# Patient Record
Sex: MALE | Race: BLACK OR AFRICAN AMERICAN | NOT HISPANIC OR LATINO | Employment: STUDENT | RURAL
[De-identification: names, ages, dates, MRNs, and addresses within clinical notes are randomized per-mention and may not be internally consistent; named-entity substitution may affect disease eponyms.]

---

## 2021-01-04 ENCOUNTER — HISTORICAL (OUTPATIENT)
Dept: ADMINISTRATIVE | Facility: HOSPITAL | Age: 21
End: 2021-01-04

## 2022-02-10 ENCOUNTER — OFFICE VISIT (OUTPATIENT)
Dept: FAMILY MEDICINE | Facility: CLINIC | Age: 22
End: 2022-02-10

## 2022-02-10 VITALS
TEMPERATURE: 98 F | SYSTOLIC BLOOD PRESSURE: 120 MMHG | HEART RATE: 86 BPM | RESPIRATION RATE: 18 BRPM | HEIGHT: 72 IN | OXYGEN SATURATION: 99 % | BODY MASS INDEX: 42.66 KG/M2 | WEIGHT: 315 LBS | DIASTOLIC BLOOD PRESSURE: 60 MMHG

## 2022-02-10 DIAGNOSIS — Z00.00 ROUTINE GENERAL MEDICAL EXAMINATION AT A HEALTH CARE FACILITY: Primary | ICD-10-CM

## 2022-02-10 PROCEDURE — 99499 PR PHYSICAL - SPORTS/SCHOOL: ICD-10-PCS | Mod: ,,, | Performed by: NURSE PRACTITIONER

## 2022-02-10 PROCEDURE — 99499 UNLISTED E&M SERVICE: CPT | Mod: ,,, | Performed by: NURSE PRACTITIONER

## 2022-02-10 NOTE — PROGRESS NOTES
SHAHEED Mora   [facility]  [address] [address] [address]  [phone]      PATIENT NAME: Rony Silva  : 2000  DATE: 2/10/22  MRN: [id number]      Billing Provider: SHAHEED Mora  Level of Service:   Patient PCP Information     Provider PCP Type    SHAHEED Mora General          Reason for Visit / Chief Complaint: Annual Exam (Sports Physical)       Update PCP  Update Chief Complaint         History of Present Illness / Problem Focused Workflow       Presents today for sports phyical. Denies any current problems  Reports left knee surgery 2016      Review of Systems     Review of Systems   Constitutional: Negative for chills, fatigue and fever.   HENT: Negative for congestion and sore throat.    Respiratory: Negative for cough and shortness of breath.    Cardiovascular: Negative for chest pain and palpitations.   Gastrointestinal: Negative for abdominal pain and diarrhea.   Genitourinary: Negative for dysuria.   Musculoskeletal: Negative for back pain and gait problem.        Old injury to left knee with surgical repair in 2016  Denies any problems with joint pain   Skin: Negative for rash.   Allergic/Immunologic: Negative for environmental allergies.   Neurological: Negative for dizziness, weakness and headaches.   Psychiatric/Behavioral: Negative for behavioral problems and dysphoric mood. The patient is not nervous/anxious.        Medical / Social / Family History   History reviewed. No pertinent past medical history.    Past Surgical History:   Procedure Laterality Date    Knee Scope         Social History    reports that he has never smoked. He has never used smokeless tobacco. He reports previous alcohol use. He reports previous drug use.    Family History  's family history includes Diabetes in his father; No Known Problems in his mother.    Medications and Allergies     Medications  No outpatient medications have been marked as taking  for the 2/10/22 encounter (Office Visit) with SHAHEED Mora.       Allergies  Review of patient's allergies indicates:  No Known Allergies    Physical Examination     Vitals:    02/10/22 0931   BP: 120/60   Pulse: 86   Resp: 18   Temp: 98 °F (36.7 °C)     Physical Exam  Constitutional:       General: He is not in acute distress.  HENT:      Head: Normocephalic.      Right Ear: Tympanic membrane normal.      Left Ear: Tympanic membrane normal.      Nose: Nose normal. No congestion.      Mouth/Throat:      Mouth: Mucous membranes are moist.      Pharynx: No posterior oropharyngeal erythema.   Eyes:      Extraocular Movements: Extraocular movements intact.   Cardiovascular:      Rate and Rhythm: Normal rate.      Pulses: Normal pulses.   Pulmonary:      Effort: Pulmonary effort is normal. No respiratory distress.   Abdominal:      General: Bowel sounds are normal. There is no distension.      Palpations: Abdomen is soft.      Tenderness: There is no abdominal tenderness.   Musculoskeletal:         General: No swelling or tenderness. Normal range of motion.      Cervical back: Normal range of motion.   Skin:     General: Skin is warm.   Neurological:      Mental Status: He is alert and oriented to person, place, and time.      Motor: No weakness.      Coordination: Coordination normal.      Gait: Gait normal.   Psychiatric:         Mood and Affect: Mood normal.         Behavior: Behavior normal.           Imaging / Labs     No visits with results within 1 Day(s) from this visit.   Latest known visit with results is:   No results found for any previous visit.     X-Ray Ankle Complete Left  Narrative: Left ankle, 3 views    Indications left ankle injury and pain    There is soft tissue swelling medially and laterally    No acute fracture lines are seen  Impression: Impression:    Soft tissue swelling    Place of service: [facility]    This report has been electronically signed by Fatoumata  Remy      Assessment and Plan (including Health Maintenance)      Problem List  Smart Sets  Document Outside HM   :    Health Maintenance Due   Topic Date Due    Hepatitis C Screening  Never done    COVID-19 Vaccine (1) Never done    HPV Vaccines (1 - Male 2-dose series) Never done    HIV Screening  Never done    TETANUS VACCINE  Never done    Influenza Vaccine (1) Never done       Problem List Items Addressed This Visit    None     Visit Diagnoses     Routine general medical examination at a health care facility    -  Primary        No problems noted today with physical exam . He does report injury in 2016 with left knee surgical repair; denies any pain with movement  Follow up as needed      Signature:  SHAHEED Mora  [address] [place] [address]  [phone]    Date of encounter: 2/10/22

## 2023-03-22 ENCOUNTER — HOSPITAL ENCOUNTER (EMERGENCY)
Facility: HOSPITAL | Age: 23
Discharge: HOME | End: 2023-03-22
Payer: COMMERCIAL

## 2023-03-22 VITALS
HEART RATE: 87 BPM | DIASTOLIC BLOOD PRESSURE: 73 MMHG | HEIGHT: 72 IN | TEMPERATURE: 98 F | BODY MASS INDEX: 40.23 KG/M2 | SYSTOLIC BLOOD PRESSURE: 140 MMHG | WEIGHT: 297 LBS | RESPIRATION RATE: 18 BRPM | OXYGEN SATURATION: 99 %

## 2023-03-22 DIAGNOSIS — L80 VITILIGO: Primary | ICD-10-CM

## 2023-03-22 DIAGNOSIS — L30.9 ECZEMA, UNSPECIFIED TYPE: ICD-10-CM

## 2023-03-22 DIAGNOSIS — I73.00 RAYNAUD'S DISEASE WITHOUT GANGRENE: ICD-10-CM

## 2023-03-22 LAB
ALBUMIN SERPL BCP-MCNC: 4.2 G/DL (ref 3.5–5)
ALBUMIN/GLOB SERPL: 1 {RATIO}
ALP SERPL-CCNC: 76 U/L (ref 45–115)
ALT SERPL W P-5'-P-CCNC: 38 U/L (ref 16–61)
ANION GAP SERPL CALCULATED.3IONS-SCNC: 9 MMOL/L (ref 7–16)
AST SERPL W P-5'-P-CCNC: 28 U/L (ref 15–37)
BASOPHILS # BLD AUTO: 0.09 K/UL (ref 0–0.2)
BASOPHILS NFR BLD AUTO: 0.6 % (ref 0–1)
BILIRUB SERPL-MCNC: 0.6 MG/DL (ref ?–1.2)
BUN SERPL-MCNC: 6 MG/DL (ref 7–18)
BUN/CREAT SERPL: 9 (ref 6–20)
CALCIUM SERPL-MCNC: 9.6 MG/DL (ref 8.5–10.1)
CHLORIDE SERPL-SCNC: 102 MMOL/L (ref 98–107)
CO2 SERPL-SCNC: 30 MMOL/L (ref 21–32)
CREAT SERPL-MCNC: 0.67 MG/DL (ref 0.7–1.3)
DIFFERENTIAL METHOD BLD: ABNORMAL
EGFR (NO RACE VARIABLE) (RUSH/TITUS): 136 ML/MIN/1.73M²
EOSINOPHIL # BLD AUTO: 0.14 K/UL (ref 0–0.5)
EOSINOPHIL NFR BLD AUTO: 1 % (ref 1–4)
ERYTHROCYTE [DISTWIDTH] IN BLOOD BY AUTOMATED COUNT: 13.5 % (ref 11.5–14.5)
GLOBULIN SER-MCNC: 4.3 G/DL (ref 2–4)
GLUCOSE SERPL-MCNC: 87 MG/DL (ref 74–106)
HCT VFR BLD AUTO: 45.7 % (ref 40–54)
HGB BLD-MCNC: 14.3 G/DL (ref 13.5–18)
IMM GRANULOCYTES # BLD AUTO: 0.07 K/UL (ref 0–0.04)
IMM GRANULOCYTES NFR BLD: 0.5 % (ref 0–0.4)
LYMPHOCYTES # BLD AUTO: 2.05 K/UL (ref 1–4.8)
LYMPHOCYTES NFR BLD AUTO: 14.8 % (ref 27–41)
MCH RBC QN AUTO: 26.1 PG (ref 27–31)
MCHC RBC AUTO-ENTMCNC: 31.3 G/DL (ref 32–36)
MCV RBC AUTO: 83.5 FL (ref 80–96)
MONOCYTES # BLD AUTO: 1.17 K/UL (ref 0–0.8)
MONOCYTES NFR BLD AUTO: 8.4 % (ref 2–6)
MPC BLD CALC-MCNC: 9.7 FL (ref 9.4–12.4)
NEUTROPHILS # BLD AUTO: 10.37 K/UL (ref 1.8–7.7)
NEUTROPHILS NFR BLD AUTO: 74.7 % (ref 53–65)
NRBC # BLD AUTO: 0 X10E3/UL
NRBC, AUTO (.00): 0 %
PLATELET # BLD AUTO: 440 K/UL (ref 150–400)
POTASSIUM SERPL-SCNC: 3.4 MMOL/L (ref 3.5–5.1)
PROT SERPL-MCNC: 8.5 G/DL (ref 6.4–8.2)
RBC # BLD AUTO: 5.47 M/UL (ref 4.6–6.2)
SODIUM SERPL-SCNC: 138 MMOL/L (ref 136–145)
WBC # BLD AUTO: 13.89 K/UL (ref 4.5–11)

## 2023-03-22 PROCEDURE — 99284 EMERGENCY DEPT VISIT MOD MDM: CPT | Mod: ,,, | Performed by: NURSE PRACTITIONER

## 2023-03-22 PROCEDURE — 99283 EMERGENCY DEPT VISIT LOW MDM: CPT

## 2023-03-22 PROCEDURE — 25000003 PHARM REV CODE 250: Performed by: NURSE PRACTITIONER

## 2023-03-22 PROCEDURE — 99284 PR EMERGENCY DEPT VISIT,LEVEL IV: ICD-10-PCS | Mod: ,,, | Performed by: NURSE PRACTITIONER

## 2023-03-22 PROCEDURE — 85651 RBC SED RATE NONAUTOMATED: CPT | Performed by: NURSE PRACTITIONER

## 2023-03-22 PROCEDURE — 80053 COMPREHEN METABOLIC PANEL: CPT | Performed by: NURSE PRACTITIONER

## 2023-03-22 PROCEDURE — 85025 COMPLETE CBC W/AUTO DIFF WBC: CPT | Performed by: NURSE PRACTITIONER

## 2023-03-22 RX ORDER — POTASSIUM CHLORIDE 20 MEQ/1
40 TABLET, EXTENDED RELEASE ORAL
Status: COMPLETED | OUTPATIENT
Start: 2023-03-22 | End: 2023-03-22

## 2023-03-22 RX ORDER — HYDROCORTISONE 25 MG/G
OINTMENT TOPICAL 2 TIMES DAILY
Qty: 20 G | Refills: 0 | Status: SHIPPED | OUTPATIENT
Start: 2023-03-22 | End: 2023-06-16 | Stop reason: ALTCHOICE

## 2023-03-22 RX ADMIN — POTASSIUM CHLORIDE 40 MEQ: 1500 TABLET, EXTENDED RELEASE ORAL at 11:03

## 2023-03-23 LAB — ERYTHROCYTE [SEDIMENTATION RATE] IN BLOOD BY WESTERGREN METHOD: 4 MM/HR (ref 0–15)

## 2023-03-23 NOTE — DISCHARGE INSTRUCTIONS
[facility] information: [phone]  Dr. Whitley, Shantal Hannah, SHAHEED;  SHAHEED Arias    Return to ED if any new or worsening of symptoms occur.

## 2023-03-23 NOTE — ED PROVIDER NOTES
Encounter Date: 3/22/2023       History     Chief Complaint   Patient presents with    General Illness     Pt states he has been having issues with his fingers turning cold since before [place] - pt went to [facility] in December but that he left before they saw him - pt has not followed up with PCP due to no insurance     22 year old male presents to ED with complaint of finger pain with coldness, reflux, rash, and erectile dysfunction. Patient states hands have been off and on cold with numbness/pain and discoloration for approximately 3 months. Patient states he has a sore to his finger that intermittently heals and gets worse. Denies other known trauma. He also states discoloration to skin on his fingers with skin lightening that is not his norm. Patient endorses rash that appears intermittently to inner buttocks and lower left buttock fold with increased irritation if he slides into a sitting position. Endorses previous use of sukumar and topical diaper rash creams that have been ineffective. Denies changes in products to include soaps, lotions, detergents. Reflux symptoms have also been intermittent with worsening over the last month at nighttime. Endorses relief of symptoms with laying on his left side. Erectile dysfunction also reported. Denies pertinent medical history; familial history of diabetes. Denies chest pain, SOB, fever, chills, nausea/vomiting, diarrhea, abdominal pain.     The history is provided by the patient.   Illness   The current episode started several weeks ago. The problem occurs frequently. Progression since onset: waxing/waning. Nothing relieves the symptoms. Nothing aggravates the symptoms. Associated symptoms include rash. Pertinent negatives include no photophobia, no constipation, no diarrhea, no nausea, no vomiting, no congestion, no cough and no shortness of breath. He has received no recent medical care.   Review of patient's allergies indicates:  No Known Allergies  History  reviewed. No pertinent past medical history.  Past Surgical History:   Procedure Laterality Date    Knee Scope       Family History   Problem Relation Age of Onset    No Known Problems Mother     Diabetes Father      Social History     Tobacco Use    Smoking status: Never    Smokeless tobacco: Never   Substance Use Topics    Alcohol use: Not Currently    Drug use: Not Currently     Review of Systems   Constitutional:  Negative for activity change and appetite change.   HENT:  Negative for congestion, sinus pressure and sinus pain.    Eyes:  Negative for photophobia and visual disturbance.   Respiratory:  Negative for cough and shortness of breath.    Cardiovascular:  Negative for chest pain and palpitations.   Gastrointestinal:  Negative for constipation, diarrhea, nausea and vomiting.   Endocrine: Negative for polydipsia and polyphagia.   Genitourinary:  Negative for difficulty urinating, frequency, penile pain and penile swelling.   Musculoskeletal:  Positive for arthralgias. Negative for joint swelling.   Skin:  Positive for color change and rash.   Allergic/Immunologic: Negative for environmental allergies and food allergies.   Neurological:  Positive for numbness. Negative for dizziness.   Hematological:  Negative for adenopathy. Does not bruise/bleed easily.   Psychiatric/Behavioral:  Negative for agitation and confusion.    All other systems reviewed and are negative.    Physical Exam     Initial Vitals   BP Pulse Resp Temp SpO2   03/22/23 2142 03/22/23 2142 03/22/23 2141 03/22/23 2141 03/22/23 2142   (!) 140/73 87 18 97.7 °F (36.5 °C) 99 %      MAP       --                Physical Exam    Nursing note and vitals reviewed.  Constitutional: He appears well-developed and well-nourished.   HENT:   Head: Normocephalic and atraumatic.   Eyes: EOM are normal. Pupils are equal, round, and reactive to light.   Neck: Neck supple.   Normal range of motion.  Cardiovascular:  Normal rate and regular rhythm.            Pulmonary/Chest: He has no wheezes. He has no rhonchi.   Abdominal: Abdomen is soft. He exhibits no distension. There is no abdominal tenderness.   Musculoskeletal:         General: Tenderness present. No edema.      Right hand: Decreased capillary refill.      Left hand: Decreased capillary refill.      Cervical back: Normal range of motion and neck supple.      Comments: Delayed cap refill with peripheral cyanosis to finger tips; hypopigmented spots to fingers/dorsum of hand     Skin: Skin is dry. Capillary refill takes 2 to 3 seconds. Rash noted. Rash is maculopapular.        Psychiatric: He has a normal mood and affect. Thought content normal.       Medical Screening Exam   See Full Note    ED Course   Procedures  Labs Reviewed   COMPREHENSIVE METABOLIC PANEL - Abnormal; Notable for the following components:       Result Value    Potassium 3.4 (*)     BUN 6 (*)     Creatinine 0.67 (*)     Total Protein 8.5 (*)     Globulin 4.3 (*)     All other components within normal limits   CBC WITH DIFFERENTIAL - Abnormal; Notable for the following components:    WBC 13.89 (*)     MCH 26.1 (*)     MCHC 31.3 (*)     Platelet Count 440 (*)     Neutrophils % 74.7 (*)     Lymphocytes % 14.8 (*)     Monocytes % 8.4 (*)     Immature Granulocytes % 0.5 (*)     Neutrophils, Abs 10.37 (*)     Monocytes, Absolute 1.17 (*)     Immature Granulocytes, Absolute 0.07 (*)     All other components within normal limits   SEDIMENTATION RATE, AUTOMATED - Normal   CBC W/ AUTO DIFFERENTIAL    Narrative:     The following orders were created for panel order CBC auto differential.  Procedure                               Abnormality         Status                     ---------                               -----------         ------                     CBC with Differential[[id number]]        Abnormal            Final result                 Please view results for these tests on the individual orders.          Imaging Results    None           Medications   potassium chloride SA CR tablet 40 mEq (40 mEq Oral Given 3/22/23 7869)     Medical Decision Making:   Initial Assessment:   Rash  Cold fingers  ED  Differential Diagnosis:   Eczema  Raynaud's      Clinical Tests:   Lab Tests: Ordered and Reviewed  ED Management:  MDM    Patient presents for emergent evaluation of acute general illness that poses a threat to life and/or bodily function.    In the ED patient found to have acute eczema; raynaud's syndrome, vitilago.    I ordered labs and personally reviewed them.  Labs significant for potassium 3.4; WBC 13.89; remaining labs unremarkable    Discharge MDM  Patient was managed in the ED with PO Potassium.    The response to treatment was good.    Patient was discharged in stable condition.  Detailed return precautions discussed.  Patient with referrals to Rheumatology and Dermatology; encouraged to establish care with PCP for ED symptoms; labs unremarkable for medical reasons for ED.                  Clinical Impression:   Final diagnoses:  [L80] Vitiligo (Primary)  [L30.9] Eczema, unspecified type  [I73.00] Raynaud's disease without gangrene        ED Disposition Condition    Discharge Stable          ED Prescriptions       Medication Sig Dispense Start Date End Date Auth. Provider    hydrocortisone 2.5 % ointment (Expires today) Apply topically 2 (two) times daily. for 7 days 20 g 3/22/2023 3/29/2023 SHAHEED Palmer          Follow-up Information    None          SHAHEED Palmer  03/29/23 9959

## 2023-03-31 ENCOUNTER — OFFICE VISIT (OUTPATIENT)
Dept: FAMILY MEDICINE | Facility: CLINIC | Age: 23
End: 2023-03-31
Payer: COMMERCIAL

## 2023-03-31 VITALS
DIASTOLIC BLOOD PRESSURE: 62 MMHG | OXYGEN SATURATION: 98 % | RESPIRATION RATE: 18 BRPM | HEART RATE: 82 BPM | TEMPERATURE: 98 F | SYSTOLIC BLOOD PRESSURE: 110 MMHG | HEIGHT: 72 IN | BODY MASS INDEX: 40.36 KG/M2 | WEIGHT: 298 LBS

## 2023-03-31 DIAGNOSIS — I73.00 RAYNAUD'S SYNDROME WITHOUT GANGRENE: ICD-10-CM

## 2023-03-31 DIAGNOSIS — N52.8 OTHER MALE ERECTILE DYSFUNCTION: ICD-10-CM

## 2023-03-31 DIAGNOSIS — L80 VITILIGO: ICD-10-CM

## 2023-03-31 LAB
ALBUMIN SERPL BCP-MCNC: 3.8 G/DL (ref 3.5–5)
ALBUMIN/GLOB SERPL: 0.9 {RATIO}
ALP SERPL-CCNC: 62 U/L (ref 45–115)
ALT SERPL W P-5'-P-CCNC: 36 U/L (ref 16–61)
ANION GAP SERPL CALCULATED.3IONS-SCNC: 9 MMOL/L (ref 7–16)
AST SERPL W P-5'-P-CCNC: 30 U/L (ref 15–37)
BASOPHILS # BLD AUTO: 0.07 K/UL (ref 0–0.2)
BASOPHILS NFR BLD AUTO: 0.6 % (ref 0–1)
BILIRUB SERPL-MCNC: 0.5 MG/DL (ref ?–1.2)
BUN SERPL-MCNC: 10 MG/DL (ref 7–18)
BUN/CREAT SERPL: 17 (ref 6–20)
CALCIUM SERPL-MCNC: 9.3 MG/DL (ref 8.5–10.1)
CHLORIDE SERPL-SCNC: 103 MMOL/L (ref 98–107)
CO2 SERPL-SCNC: 30 MMOL/L (ref 21–32)
CREAT SERPL-MCNC: 0.58 MG/DL (ref 0.7–1.3)
CRP SERPL-MCNC: 0.96 MG/DL (ref 0–0.8)
DIFFERENTIAL METHOD BLD: ABNORMAL
EGFR (NO RACE VARIABLE) (RUSH/TITUS): 142 ML/MIN/1.73M²
EOSINOPHIL # BLD AUTO: 0.17 K/UL (ref 0–0.5)
EOSINOPHIL NFR BLD AUTO: 1.6 % (ref 1–4)
ERYTHROCYTE [DISTWIDTH] IN BLOOD BY AUTOMATED COUNT: 13.5 % (ref 11.5–14.5)
ERYTHROCYTE [SEDIMENTATION RATE] IN BLOOD BY WESTERGREN METHOD: 2 MM/HR (ref 0–15)
FSH SERPL-ACNC: 6.7 MIU/ML (ref 1.5–12.4)
GLOBULIN SER-MCNC: 4.2 G/DL (ref 2–4)
GLUCOSE SERPL-MCNC: 74 MG/DL (ref 74–106)
HCT VFR BLD AUTO: 48.4 % (ref 40–54)
HGB BLD-MCNC: 14.7 G/DL (ref 13.5–18)
IMM GRANULOCYTES # BLD AUTO: 0.03 K/UL (ref 0–0.04)
IMM GRANULOCYTES NFR BLD: 0.3 % (ref 0–0.4)
LH SERPL-ACNC: 4.1 MIU/ML
LYMPHOCYTES # BLD AUTO: 2.08 K/UL (ref 1–4.8)
LYMPHOCYTES NFR BLD AUTO: 19.3 % (ref 27–41)
MCH RBC QN AUTO: 25.8 PG (ref 27–31)
MCHC RBC AUTO-ENTMCNC: 30.4 G/DL (ref 32–36)
MCV RBC AUTO: 84.9 FL (ref 80–96)
MONOCYTES # BLD AUTO: 1.37 K/UL (ref 0–0.8)
MONOCYTES NFR BLD AUTO: 12.7 % (ref 2–6)
MPC BLD CALC-MCNC: 10.5 FL (ref 9.4–12.4)
NEUTROPHILS # BLD AUTO: 7.08 K/UL (ref 1.8–7.7)
NEUTROPHILS NFR BLD AUTO: 65.5 % (ref 53–65)
NRBC # BLD AUTO: 0 X10E3/UL
NRBC, AUTO (.00): 0 %
PLATELET # BLD AUTO: 396 K/UL (ref 150–400)
POTASSIUM SERPL-SCNC: 4 MMOL/L (ref 3.5–5.1)
PROLACTIN SERPL-MCNC: 8.1 NG/ML
PROT SERPL-MCNC: 8 G/DL (ref 6.4–8.2)
RBC # BLD AUTO: 5.7 M/UL (ref 4.6–6.2)
RHEUMATOID FACT SER NEPH-ACNC: NEGATIVE [IU]/ML
SODIUM SERPL-SCNC: 138 MMOL/L (ref 136–145)
T4 SERPL-MCNC: 8 ΜG/DL (ref 4.5–12.1)
TSH SERPL DL<=0.005 MIU/L-ACNC: 3.76 UIU/ML (ref 0.36–3.74)
URATE SERPL-MCNC: 7.8 MG/DL (ref 3.5–7.2)
WBC # BLD AUTO: 10.8 K/UL (ref 4.5–11)

## 2023-03-31 PROCEDURE — 84436 T4: ICD-10-PCS | Mod: ,,, | Performed by: CLINICAL MEDICAL LABORATORY

## 2023-03-31 PROCEDURE — 86225 DNA ANTIBODY NATIVE: CPT | Mod: ,,, | Performed by: CLINICAL MEDICAL LABORATORY

## 2023-03-31 PROCEDURE — 83002 LUTEINIZING HORMONE: ICD-10-PCS | Mod: ,,, | Performed by: CLINICAL MEDICAL LABORATORY

## 2023-03-31 PROCEDURE — 86038 ANA EIA W/REFLEX DSDNA/ENA: ICD-10-PCS | Mod: ,,, | Performed by: CLINICAL MEDICAL LABORATORY

## 2023-03-31 PROCEDURE — 86140 C-REACTIVE PROTEIN: CPT | Mod: ,,, | Performed by: CLINICAL MEDICAL LABORATORY

## 2023-03-31 PROCEDURE — 86235 NUCLEAR ANTIGEN ANTIBODY: CPT | Mod: ,,, | Performed by: CLINICAL MEDICAL LABORATORY

## 2023-03-31 PROCEDURE — 99215 OFFICE O/P EST HI 40 MIN: CPT | Mod: ,,, | Performed by: FAMILY MEDICINE

## 2023-03-31 PROCEDURE — 1159F MED LIST DOCD IN RCRD: CPT | Mod: ,,, | Performed by: FAMILY MEDICINE

## 2023-03-31 PROCEDURE — 84550 URIC ACID: ICD-10-PCS | Mod: ,,, | Performed by: CLINICAL MEDICAL LABORATORY

## 2023-03-31 PROCEDURE — 3078F DIAST BP <80 MM HG: CPT | Mod: ,,, | Performed by: FAMILY MEDICINE

## 2023-03-31 PROCEDURE — 85651 SEDIMENTATION RATE, AUTOMATED: ICD-10-PCS | Mod: ,,, | Performed by: CLINICAL MEDICAL LABORATORY

## 2023-03-31 PROCEDURE — 86225 ANTI-DNA ANTIBODY, DOUBLE-STRANDED: ICD-10-PCS | Mod: ,,, | Performed by: CLINICAL MEDICAL LABORATORY

## 2023-03-31 PROCEDURE — 3074F PR MOST RECENT SYSTOLIC BLOOD PRESSURE < 130 MM HG: ICD-10-PCS | Mod: ,,, | Performed by: FAMILY MEDICINE

## 2023-03-31 PROCEDURE — 80050 GENERAL HEALTH PANEL: CPT | Mod: ,,, | Performed by: CLINICAL MEDICAL LABORATORY

## 2023-03-31 PROCEDURE — 99215 PR OFFICE/OUTPT VISIT, EST, LEVL V, 40-54 MIN: ICD-10-PCS | Mod: ,,, | Performed by: FAMILY MEDICINE

## 2023-03-31 PROCEDURE — 1159F PR MEDICATION LIST DOCUMENTED IN MEDICAL RECORD: ICD-10-PCS | Mod: ,,, | Performed by: FAMILY MEDICINE

## 2023-03-31 PROCEDURE — 86430 RHEUMATOID FACTOR TEST QUAL: CPT | Mod: ,,, | Performed by: CLINICAL MEDICAL LABORATORY

## 2023-03-31 PROCEDURE — 83001 FOLLICLE STIMULATING HORMONE: ICD-10-PCS | Mod: ,,, | Performed by: CLINICAL MEDICAL LABORATORY

## 2023-03-31 PROCEDURE — 85651 RBC SED RATE NONAUTOMATED: CPT | Mod: ,,, | Performed by: CLINICAL MEDICAL LABORATORY

## 2023-03-31 PROCEDURE — 83001 ASSAY OF GONADOTROPIN (FSH): CPT | Mod: ,,, | Performed by: CLINICAL MEDICAL LABORATORY

## 2023-03-31 PROCEDURE — 3008F BODY MASS INDEX DOCD: CPT | Mod: ,,, | Performed by: FAMILY MEDICINE

## 2023-03-31 PROCEDURE — 84436 ASSAY OF TOTAL THYROXINE: CPT | Mod: ,,, | Performed by: CLINICAL MEDICAL LABORATORY

## 2023-03-31 PROCEDURE — 3078F PR MOST RECENT DIASTOLIC BLOOD PRESSURE < 80 MM HG: ICD-10-PCS | Mod: ,,, | Performed by: FAMILY MEDICINE

## 2023-03-31 PROCEDURE — 3074F SYST BP LT 130 MM HG: CPT | Mod: ,,, | Performed by: FAMILY MEDICINE

## 2023-03-31 PROCEDURE — 86235 ANTIEXTRACTABLE NUCLEAR AG: ICD-10-PCS | Mod: ,,, | Performed by: CLINICAL MEDICAL LABORATORY

## 2023-03-31 PROCEDURE — 86140 C-REACTIVE PROTEIN: ICD-10-PCS | Mod: ,,, | Performed by: CLINICAL MEDICAL LABORATORY

## 2023-03-31 PROCEDURE — 84146 PROLACTIN: ICD-10-PCS | Mod: ,,, | Performed by: CLINICAL MEDICAL LABORATORY

## 2023-03-31 PROCEDURE — 86430 RHEUMATOID FACTOR SCREEN: ICD-10-PCS | Mod: ,,, | Performed by: CLINICAL MEDICAL LABORATORY

## 2023-03-31 PROCEDURE — 3008F PR BODY MASS INDEX (BMI) DOCUMENTED: ICD-10-PCS | Mod: ,,, | Performed by: FAMILY MEDICINE

## 2023-03-31 PROCEDURE — 83002 ASSAY OF GONADOTROPIN (LH): CPT | Mod: ,,, | Performed by: CLINICAL MEDICAL LABORATORY

## 2023-03-31 PROCEDURE — 84146 ASSAY OF PROLACTIN: CPT | Mod: ,,, | Performed by: CLINICAL MEDICAL LABORATORY

## 2023-03-31 PROCEDURE — 86038 ANTINUCLEAR ANTIBODIES: CPT | Mod: ,,, | Performed by: CLINICAL MEDICAL LABORATORY

## 2023-03-31 PROCEDURE — 80050 PR  GENERAL HEALTH PANEL: ICD-10-PCS | Mod: ,,, | Performed by: CLINICAL MEDICAL LABORATORY

## 2023-03-31 PROCEDURE — 84550 ASSAY OF BLOOD/URIC ACID: CPT | Mod: ,,, | Performed by: CLINICAL MEDICAL LABORATORY

## 2023-03-31 RX ORDER — SILDENAFIL 50 MG/1
50 TABLET, FILM COATED ORAL DAILY PRN
Qty: 30 TABLET | Refills: 1 | Status: SHIPPED | OUTPATIENT
Start: 2023-03-31 | End: 2023-05-26 | Stop reason: SDUPTHER

## 2023-03-31 NOTE — PROGRESS NOTES
Lester Whitley DO   [address] [place]  [address]      PATIENT NAME: Rony Silva  : 2000  DATE: 3/31/23  MRN: [id number]      Billing Provider: Lester Whitley DO  Level of Service:   Patient PCP Information       Provider PCP Type    Lester Whitley DO General            Reason for Visit / Chief Complaint: ER Follow Up (Pt was seen at [facility] on 23 and was diagnosed with Raynaud's disease. Pt c/o painful open sore to his right index finger and soreness to his right middle finger. ) and Vitiligo (Pt was also diagnosed with Vitiligo while at the ER and is scheduled with [facility] on 2023.)       Update PCP  Update Chief Complaint         History of Present Illness / Problem Focused Workflow     Rony Silva presents to the clinic with ER Follow Up (Pt was seen at [facility] on 23 and was diagnosed with Raynaud's disease. Pt c/o painful open sore to his right index finger and soreness to his right middle finger. ) and Vitiligo (Pt was also diagnosed with Vitiligo while at the ER and is scheduled with [facility] on 2023.)     Patient is noted to have Raynaud's phenomenon and was seen in the emergency room for the same.  He did have some small ulcerations on the tips of his fingers on right hand that if he will.  He states this is been going on for at least 2 months.  Is progressively getting worse.  He also noted is vitiligo which is been progressively getting worse over the last 2 years.  He also reports that recently he is had difficulty with the erections in that he is not able to either get her maintain an erection.  No history of trauma.      Review of Systems     Review of Systems   Constitutional:  Negative for activity change, appetite change, chills, fatigue and fever.   HENT:  Negative for nasal congestion, ear discharge, ear pain, mouth dryness, mouth sores, postnasal drip, sinus pressure/congestion, sore throat and  voice change.    Eyes:  Negative for pain, discharge, redness, itching and visual disturbance.   Respiratory:  Negative for apnea, cough, chest tightness, shortness of breath and wheezing.    Cardiovascular:  Negative for chest pain, palpitations and leg swelling.   Gastrointestinal:  Negative for abdominal distention, abdominal pain, anal bleeding, blood in stool, change in bowel habit, constipation, diarrhea, nausea, vomiting, reflux and change in bowel habit.   Endocrine: Negative for cold intolerance, heat intolerance, polydipsia, polyphagia and polyuria.   Genitourinary:  Positive for erectile dysfunction. Negative for difficulty urinating, enuresis, frequency, genital sores, hematuria and urgency.   Musculoskeletal:  Negative for arthralgias, back pain, gait problem, leg pain, myalgias and neck pain.   Integumentary:  Positive for color change and rash. Negative for mole/lesion, breast mass and breast discharge.   Allergic/Immunologic: Negative for environmental allergies and food allergies.   Neurological:  Negative for dizziness, vertigo, tremors, seizures, syncope, facial asymmetry, speech difficulty, weakness, light-headedness, numbness, headaches, coordination difficulties, memory loss and coordination difficulties.   Hematological:  Negative for adenopathy. Does not bruise/bleed easily.   Psychiatric/Behavioral:  Negative for agitation, behavioral problems, confusion, decreased concentration, dysphoric mood, hallucinations, self-injury, sleep disturbance and suicidal ideas. The patient is not nervous/anxious and is not hyperactive.    Breast: Negative for mass    Medical / Social / Family History     Past Medical History:   Diagnosis Date    Raynaud's syndrome without gangrene 03/22/2023    Vitiligo 03/22/2023       Past Surgical History:   Procedure Laterality Date    Knee Scope Left 2017       Social History    reports that he has never smoked. He has never been exposed to tobacco smoke. He has  never used smokeless tobacco. He reports that he does not currently use alcohol. He reports that he does not currently use drugs.    Family History  's family history includes Diabetes in his father; No Known Problems in his mother.    Medications and Allergies     Medications  No outpatient medications have been marked as taking for the 3/31/23 encounter (Office Visit) with Lester Whitley DO.       Allergies  Review of patient's allergies indicates:   Allergen Reactions    Acetomenaphthone Other (See Comments)     Elevated liver enzymes when in childhood       Physical Examination     Vitals:    03/31/23 0943   BP: 110/62   Pulse: 82   Resp: 18   Temp: 97.8 °F (36.6 °C)     Physical Exam  Constitutional:       General: He is not in acute distress.     Appearance: Normal appearance. He is obese.   HENT:      Head: Normocephalic.      Nose: Nose normal.      Mouth/Throat:      Mouth: Mucous membranes are moist.      Pharynx: Oropharynx is clear. No oropharyngeal exudate or posterior oropharyngeal erythema.   Eyes:      General: No scleral icterus.        Right eye: No discharge.         Left eye: No discharge.      Conjunctiva/sclera: Conjunctivae normal.      Pupils: Pupils are equal, round, and reactive to light.   Cardiovascular:      Rate and Rhythm: Normal rate and regular rhythm.      Pulses: Normal pulses.      Heart sounds: Normal heart sounds. No murmur heard.  Pulmonary:      Effort: Pulmonary effort is normal.      Breath sounds: Normal breath sounds. No wheezing.   Abdominal:      General: Abdomen is flat. Bowel sounds are normal.      Tenderness: There is no abdominal tenderness.   Musculoskeletal:         General: Normal range of motion.      Cervical back: Normal range of motion.      Right lower leg: No edema.      Left lower leg: No edema.   Skin:     General: Skin is warm.      Capillary Refill: Capillary refill takes less than 2 seconds.      Comments: Depigmentation of his skin noted  primarily on hands but some on his arms.   Neurological:      General: No focal deficit present.      Mental Status: He is alert and oriented to person, place, and time. Mental status is at baseline.      Cranial Nerves: No cranial nerve deficit.      Sensory: No sensory deficit.      Motor: No weakness.      Coordination: Coordination normal.      Gait: Gait normal.      Deep Tendon Reflexes: Reflexes normal.   Psychiatric:         Mood and Affect: Mood normal.         Behavior: Behavior normal.         Thought Content: Thought content normal.         Judgment: Judgment normal.             Lab Results   Component Value Date    WBC 10.80 03/31/2023    HGB 14.7 03/31/2023    HCT 48.4 03/31/2023    MCV 84.9 03/31/2023     03/31/2023          Sodium   Date Value Ref Range Status   03/31/2023 138 136 - 145 mmol/L Final     Potassium   Date Value Ref Range Status   03/31/2023 4.0 3.5 - 5.1 mmol/L Final     Chloride   Date Value Ref Range Status   03/31/2023 103 98 - 107 mmol/L Final     CO2   Date Value Ref Range Status   03/31/2023 30 21 - 32 mmol/L Final     Glucose   Date Value Ref Range Status   03/31/2023 74 74 - 106 mg/dL Final     BUN   Date Value Ref Range Status   03/31/2023 10 7 - 18 mg/dL Final     Creatinine   Date Value Ref Range Status   03/31/2023 0.58 (L) 0.70 - 1.30 mg/dL Final     Calcium   Date Value Ref Range Status   03/31/2023 9.3 8.5 - 10.1 mg/dL Final     Total Protein   Date Value Ref Range Status   03/31/2023 8.0 6.4 - 8.2 g/dL Final     Albumin   Date Value Ref Range Status   03/31/2023 3.8 3.5 - 5.0 g/dL Final     Bilirubin, Total   Date Value Ref Range Status   03/31/2023 0.5 >0.0 - 1.2 mg/dL Final     Alk Phos   Date Value Ref Range Status   03/31/2023 62 45 - 115 U/L Final     AST   Date Value Ref Range Status   03/31/2023 30 15 - 37 U/L Final     ALT   Date Value Ref Range Status   03/31/2023 36 16 - 61 U/L Final     Anion Gap   Date Value Ref Range Status   03/31/2023 9 7 - 16  mmol/L Final      X-Ray Ankle Complete Left  Narrative: Left ankle, 3 views    Indications left ankle injury and pain    There is soft tissue swelling medially and laterally    No acute fracture lines are seen  Impression: Impression:    Soft tissue swelling    Place of service: [facility]    This report has been electronically signed by Fatoumata Alberto     Procedures   No results found for: CHOL  No results found for: HDL  No results found for: LDLCALC  No results found for: TRIG  No results found for: CHOLHDL   No results found for: LABA1C, HGBA1C   Lab Results   Component Value Date    TSH 3.760 (H) 03/31/2023    [id number] 8.0 03/31/2023      Assessment and Plan (including Health Maintenance)      Problem List  Smart Sets  Document Outside HM   :    Plan:         Health Maintenance Due   Topic Date Due    Hepatitis C Screening  Never done    COVID-19 Vaccine (1) Never done    HPV Vaccines (1 - Male 2-dose series) Never done    HIV Screening  Never done    TETANUS VACCINE  Never done    Influenza Vaccine (1) Never done       Problem List Items Addressed This Visit          Derm    Vitiligo    Current Assessment & Plan     Vitiligo etiology uncertain but suspect autoimmune response.  Dermatology referral for treatment           Relevant Orders    CBC Auto Differential (Completed)    Comprehensive Metabolic Panel (Completed)    Ambulatory referral/consult to Rheumatology       Cardiac/Vascular    Raynaud's syndrome without gangrene    Current Assessment & Plan     Raynaud's phenomenon etiology uncertain.  Will check DIANE rheumatoid factor sed rate CRP CBC CMP.  Avoid extreme heat or cold.  Follow-up after the tests are complete.  Rheumatology referral.           Relevant Orders    CBC Auto Differential (Completed)    Comprehensive Metabolic Panel (Completed)    TSH (Completed)    T4 (Completed)    Testosterone, Free & Total    Luteinizing Hormone (Completed)    Follicle Stimulating Hormone (Completed)     Prolactin (Completed)    C-Reactive Protein (Completed)    Sedimentation Rate (Completed)    Uric Acid (Completed)    DIANE EIA w/ Reflex to dsDNA/PHILLIP    Rheumatoid Factor Screen (Completed)    Ambulatory referral/consult to Rheumatology       Renal/    Other male erectile dysfunction    Current Assessment & Plan     Erectile dysfunction likely vascular in etiology but because of his autoimmune response with vitiligo in his Raynaud's symptoms I suspicion this is a complex autoimmune process.  Labs today.  Refer to Dermatology and Rheumatology.  Viagra p.r.n..           Relevant Medications    sildenafiL (VIAGRA) 50 MG tablet    Other Relevant Orders    Testosterone, Free & Total    Luteinizing Hormone (Completed)    Follicle Stimulating Hormone (Completed)       The patient has no Health Maintenance topics of status Not Due    Future Appointments   Date Time Provider Department Center   4/20/2023  1:15 PM Kate Marc MD [facility]        Follow up in about 4 weeks (around 4/28/2023).     Signature:  DO Miri Keitaatur [facility]  [address] [place] [address], [place]  [address]    Date of encounter: 3/31/23

## 2023-04-02 NOTE — ASSESSMENT & PLAN NOTE
Raynaud's phenomenon etiology uncertain.  Will check DIANE rheumatoid factor sed rate CRP CBC CMP.  Avoid extreme heat or cold.  Follow-up after the tests are complete.  Rheumatology referral.

## 2023-04-02 NOTE — ASSESSMENT & PLAN NOTE
Erectile dysfunction likely vascular in etiology but because of his autoimmune response with vitiligo in his Raynaud's symptoms I suspicion this is a complex autoimmune process.  Labs today.  Refer to Dermatology and Rheumatology.  Viagra p.r.n..

## 2023-04-04 LAB
ANA SER QL: POSITIVE
DSDNA IGG SERPL IA-ACNC: 4 IU (ref 0–24)
DSDNA IGG SERPL IA-ACNC: NEGATIVE [IU]/ML
ENA AB SER QL IA: 2.1 EUS (ref 0–19.9)
ENA AB SER QL IA: NEGATIVE

## 2023-05-04 ENCOUNTER — TELEPHONE (OUTPATIENT)
Dept: NEPHROLOGY | Facility: CLINIC | Age: 23
End: 2023-05-04
Payer: COMMERCIAL

## 2023-05-04 ENCOUNTER — TELEPHONE (OUTPATIENT)
Dept: RHEUMATOLOGY | Facility: CLINIC | Age: 23
End: 2023-05-04
Payer: COMMERCIAL

## 2023-05-04 NOTE — TELEPHONE ENCOUNTER
Madison Shows notified me that she had gotten a message from a patient navigator in [place] regarding his health. Madison let me know that in the message there were comments about suicidal ideations and depression. I called the patients mother, whom is worried about her child's health, and let her know that if he is having these ideations that he needs to be watched very closely and if anything else was to come about the situation then he needs to be taken to the closest ER so that he can be put on a 24 hr watch and cared for there. The mother was very appreciative of the phone call and said that if she needed anything else that she would give us a call.

## 2023-05-05 ENCOUNTER — TELEPHONE (OUTPATIENT)
Dept: RHEUMATOLOGY | Facility: CLINIC | Age: 23
End: 2023-05-05
Payer: COMMERCIAL

## 2023-05-05 NOTE — TELEPHONE ENCOUNTER
Attempted to return patient's mother call. Called both numbers in the chart without success.  Left voicemail.     Thank you,   Crystal

## 2023-05-12 ENCOUNTER — OFFICE VISIT (OUTPATIENT)
Dept: RHEUMATOLOGY | Facility: CLINIC | Age: 23
End: 2023-05-12
Payer: COMMERCIAL

## 2023-05-12 ENCOUNTER — LAB VISIT (OUTPATIENT)
Dept: LAB | Facility: HOSPITAL | Age: 23
End: 2023-05-12
Attending: INTERNAL MEDICINE
Payer: COMMERCIAL

## 2023-05-12 VITALS
HEIGHT: 72 IN | SYSTOLIC BLOOD PRESSURE: 121 MMHG | BODY MASS INDEX: 40.52 KG/M2 | WEIGHT: 299.19 LBS | DIASTOLIC BLOOD PRESSURE: 76 MMHG | HEART RATE: 91 BPM

## 2023-05-12 DIAGNOSIS — K21.9 GASTROESOPHAGEAL REFLUX DISEASE WITHOUT ESOPHAGITIS: ICD-10-CM

## 2023-05-12 DIAGNOSIS — M34.9 DIFFUSE SYSTEMIC SCLEROSIS: ICD-10-CM

## 2023-05-12 DIAGNOSIS — I73.00 RAYNAUD'S SYNDROME WITHOUT GANGRENE: ICD-10-CM

## 2023-05-12 DIAGNOSIS — L80 VITILIGO: ICD-10-CM

## 2023-05-12 DIAGNOSIS — M34.9 DIFFUSE SYSTEMIC SCLEROSIS: Primary | ICD-10-CM

## 2023-05-12 DIAGNOSIS — E66.01 CLASS 3 SEVERE OBESITY DUE TO EXCESS CALORIES WITH SERIOUS COMORBIDITY AND BODY MASS INDEX (BMI) OF 45.0 TO 49.9 IN ADULT: ICD-10-CM

## 2023-05-12 LAB
ALBUMIN SERPL BCP-MCNC: 4.2 G/DL (ref 3.5–5.2)
ALP SERPL-CCNC: 77 U/L (ref 55–135)
ALT SERPL W/O P-5'-P-CCNC: 23 U/L (ref 10–44)
ANION GAP SERPL CALC-SCNC: 7 MMOL/L (ref 8–16)
AST SERPL-CCNC: 24 U/L (ref 10–40)
BASOPHILS # BLD AUTO: 0.1 K/UL (ref 0–0.2)
BASOPHILS NFR BLD: 0.9 % (ref 0–1.9)
BILIRUB SERPL-MCNC: 0.5 MG/DL (ref 0.1–1)
BUN SERPL-MCNC: 7 MG/DL (ref 6–20)
CALCIUM SERPL-MCNC: 9.7 MG/DL (ref 8.7–10.5)
CCP AB SER IA-ACNC: <0.5 U/ML
CHLORIDE SERPL-SCNC: 102 MMOL/L (ref 95–110)
CK SERPL-CCNC: 311 U/L (ref 20–200)
CO2 SERPL-SCNC: 28 MMOL/L (ref 23–29)
CREAT SERPL-MCNC: 0.6 MG/DL (ref 0.5–1.4)
CRP SERPL-MCNC: 6.4 MG/L (ref 0–8.2)
DIFFERENTIAL METHOD: ABNORMAL
EOSINOPHIL # BLD AUTO: 0.2 K/UL (ref 0–0.5)
EOSINOPHIL NFR BLD: 1.5 % (ref 0–8)
ERYTHROCYTE [DISTWIDTH] IN BLOOD BY AUTOMATED COUNT: 13.2 % (ref 11.5–14.5)
EST. GFR  (NO RACE VARIABLE): >60 ML/MIN/1.73 M^2
GLUCOSE SERPL-MCNC: 84 MG/DL (ref 70–110)
HBV CORE AB SERPL QL IA: NORMAL
HBV SURFACE AB SER-ACNC: 5.58 MIU/ML
HBV SURFACE AB SER-ACNC: NORMAL M[IU]/ML
HBV SURFACE AG SERPL QL IA: NORMAL
HCT VFR BLD AUTO: 48.8 % (ref 40–54)
HCV AB SERPL QL IA: NORMAL
HGB BLD-MCNC: 14.6 G/DL (ref 14–18)
HIV 1+2 AB+HIV1 P24 AG SERPL QL IA: NORMAL
IMM GRANULOCYTES # BLD AUTO: 0.03 K/UL (ref 0–0.04)
IMM GRANULOCYTES NFR BLD AUTO: 0.3 % (ref 0–0.5)
LYMPHOCYTES # BLD AUTO: 2.3 K/UL (ref 1–4.8)
LYMPHOCYTES NFR BLD: 19.4 % (ref 18–48)
MCH RBC QN AUTO: 25.9 PG (ref 27–31)
MCHC RBC AUTO-ENTMCNC: 29.9 G/DL (ref 32–36)
MCV RBC AUTO: 87 FL (ref 82–98)
MONOCYTES # BLD AUTO: 1.2 K/UL (ref 0.3–1)
MONOCYTES NFR BLD: 10 % (ref 4–15)
NEUTROPHILS # BLD AUTO: 8 K/UL (ref 1.8–7.7)
NEUTROPHILS NFR BLD: 67.9 % (ref 38–73)
NRBC BLD-RTO: 0 /100 WBC
PLATELET # BLD AUTO: 488 K/UL (ref 150–450)
PMV BLD AUTO: 10.4 FL (ref 9.2–12.9)
POTASSIUM SERPL-SCNC: 3.7 MMOL/L (ref 3.5–5.1)
PROT SERPL-MCNC: 8 G/DL (ref 6–8.4)
RBC # BLD AUTO: 5.64 M/UL (ref 4.6–6.2)
SODIUM SERPL-SCNC: 137 MMOL/L (ref 136–145)
VARICELLA INTERPRETATION: POSITIVE
VARICELLA ZOSTER IGG: 640.5 AU/ML
WBC # BLD AUTO: 11.75 K/UL (ref 3.9–12.7)

## 2023-05-12 PROCEDURE — 1159F MED LIST DOCD IN RCRD: CPT | Mod: CPTII,S$GLB,, | Performed by: INTERNAL MEDICINE

## 2023-05-12 PROCEDURE — 86803 HEPATITIS C AB TEST: CPT | Performed by: INTERNAL MEDICINE

## 2023-05-12 PROCEDURE — 84165 PROTEIN E-PHORESIS SERUM: CPT | Mod: 26,,, | Performed by: PATHOLOGY

## 2023-05-12 PROCEDURE — 3078F PR MOST RECENT DIASTOLIC BLOOD PRESSURE < 80 MM HG: ICD-10-PCS | Mod: CPTII,S$GLB,, | Performed by: INTERNAL MEDICINE

## 2023-05-12 PROCEDURE — 86038 ANTINUCLEAR ANTIBODIES: CPT | Performed by: INTERNAL MEDICINE

## 2023-05-12 PROCEDURE — 87340 HEPATITIS B SURFACE AG IA: CPT | Performed by: INTERNAL MEDICINE

## 2023-05-12 PROCEDURE — 85025 COMPLETE CBC W/AUTO DIFF WBC: CPT | Performed by: INTERNAL MEDICINE

## 2023-05-12 PROCEDURE — 3074F SYST BP LT 130 MM HG: CPT | Mod: CPTII,S$GLB,, | Performed by: INTERNAL MEDICINE

## 2023-05-12 PROCEDURE — 82085 ASSAY OF ALDOLASE: CPT | Performed by: INTERNAL MEDICINE

## 2023-05-12 PROCEDURE — 82550 ASSAY OF CK (CPK): CPT | Performed by: INTERNAL MEDICINE

## 2023-05-12 PROCEDURE — 3008F PR BODY MASS INDEX (BMI) DOCUMENTED: ICD-10-PCS | Mod: CPTII,S$GLB,, | Performed by: INTERNAL MEDICINE

## 2023-05-12 PROCEDURE — 99999 PR PBB SHADOW E&M-EST. PATIENT-LVL IV: CPT | Mod: PBBFAC,,, | Performed by: INTERNAL MEDICINE

## 2023-05-12 PROCEDURE — 83516 IMMUNOASSAY NONANTIBODY: CPT | Mod: 59 | Performed by: INTERNAL MEDICINE

## 2023-05-12 PROCEDURE — 84165 PROTEIN E-PHORESIS SERUM: CPT | Performed by: INTERNAL MEDICINE

## 2023-05-12 PROCEDURE — 80053 COMPREHEN METABOLIC PANEL: CPT | Performed by: INTERNAL MEDICINE

## 2023-05-12 PROCEDURE — 36415 COLL VENOUS BLD VENIPUNCTURE: CPT | Performed by: INTERNAL MEDICINE

## 2023-05-12 PROCEDURE — 1159F PR MEDICATION LIST DOCUMENTED IN MEDICAL RECORD: ICD-10-PCS | Mod: CPTII,S$GLB,, | Performed by: INTERNAL MEDICINE

## 2023-05-12 PROCEDURE — 86235 NUCLEAR ANTIGEN ANTIBODY: CPT | Mod: 59 | Performed by: INTERNAL MEDICINE

## 2023-05-12 PROCEDURE — 86706 HEP B SURFACE ANTIBODY: CPT | Mod: 91 | Performed by: INTERNAL MEDICINE

## 2023-05-12 PROCEDURE — 83516 IMMUNOASSAY NONANTIBODY: CPT | Performed by: INTERNAL MEDICINE

## 2023-05-12 PROCEDURE — 86039 ANTINUCLEAR ANTIBODIES (ANA): CPT | Performed by: INTERNAL MEDICINE

## 2023-05-12 PROCEDURE — 3078F DIAST BP <80 MM HG: CPT | Mod: CPTII,S$GLB,, | Performed by: INTERNAL MEDICINE

## 2023-05-12 PROCEDURE — 84165 PATHOLOGIST INTERPRETATION SPE: ICD-10-PCS | Mod: 26,,, | Performed by: PATHOLOGY

## 2023-05-12 PROCEDURE — 86682 HELMINTH ANTIBODY: CPT | Performed by: INTERNAL MEDICINE

## 2023-05-12 PROCEDURE — 86200 CCP ANTIBODY: CPT | Performed by: INTERNAL MEDICINE

## 2023-05-12 PROCEDURE — 86704 HEP B CORE ANTIBODY TOTAL: CPT | Performed by: INTERNAL MEDICINE

## 2023-05-12 PROCEDURE — 3008F BODY MASS INDEX DOCD: CPT | Mod: CPTII,S$GLB,, | Performed by: INTERNAL MEDICINE

## 2023-05-12 PROCEDURE — 86235 NUCLEAR ANTIGEN ANTIBODY: CPT | Performed by: INTERNAL MEDICINE

## 2023-05-12 PROCEDURE — 86787 VARICELLA-ZOSTER ANTIBODY: CPT | Performed by: INTERNAL MEDICINE

## 2023-05-12 PROCEDURE — 86225 DNA ANTIBODY NATIVE: CPT | Performed by: INTERNAL MEDICINE

## 2023-05-12 PROCEDURE — 3074F PR MOST RECENT SYSTOLIC BLOOD PRESSURE < 130 MM HG: ICD-10-PCS | Mod: CPTII,S$GLB,, | Performed by: INTERNAL MEDICINE

## 2023-05-12 PROCEDURE — 86592 SYPHILIS TEST NON-TREP QUAL: CPT | Performed by: INTERNAL MEDICINE

## 2023-05-12 PROCEDURE — 83520 IMMUNOASSAY QUANT NOS NONAB: CPT | Performed by: INTERNAL MEDICINE

## 2023-05-12 PROCEDURE — 99999 PR PBB SHADOW E&M-EST. PATIENT-LVL IV: ICD-10-PCS | Mod: PBBFAC,,, | Performed by: INTERNAL MEDICINE

## 2023-05-12 PROCEDURE — 86140 C-REACTIVE PROTEIN: CPT | Performed by: INTERNAL MEDICINE

## 2023-05-12 PROCEDURE — 99205 OFFICE O/P NEW HI 60 MIN: CPT | Mod: S$GLB,,, | Performed by: INTERNAL MEDICINE

## 2023-05-12 PROCEDURE — 99205 PR OFFICE/OUTPT VISIT, NEW, LEVL V, 60-74 MIN: ICD-10-PCS | Mod: S$GLB,,, | Performed by: INTERNAL MEDICINE

## 2023-05-12 PROCEDURE — 87389 HIV-1 AG W/HIV-1&-2 AB AG IA: CPT | Performed by: INTERNAL MEDICINE

## 2023-05-12 RX ORDER — PANTOPRAZOLE SODIUM 40 MG/1
40 TABLET, DELAYED RELEASE ORAL DAILY
Qty: 30 TABLET | Refills: 11 | Status: SHIPPED | OUTPATIENT
Start: 2023-05-12 | End: 2023-06-06 | Stop reason: SDUPTHER

## 2023-05-12 RX ORDER — AMLODIPINE BESYLATE 5 MG/1
5 TABLET ORAL DAILY
Qty: 30 TABLET | Refills: 11 | Status: SHIPPED | OUTPATIENT
Start: 2023-05-12 | End: 2023-07-24 | Stop reason: SDUPTHER

## 2023-05-12 ASSESSMENT — ROUTINE ASSESSMENT OF PATIENT INDEX DATA (RAPID3)
AM STIFFNESS SCORE: 0, NO
PSYCHOLOGICAL DISTRESS SCORE: 0
TOTAL RAPID3 SCORE: 1
PATIENT GLOBAL ASSESSMENT SCORE: 2
MDHAQ FUNCTION SCORE: 0.3
PAIN SCORE: 0
FATIGUE SCORE: 0

## 2023-05-12 NOTE — PROGRESS NOTES
Subjective:       Patient ID: Rony Silva is a 22 y.o. male.    Chief Complaint: Disease Management    HPI  From [place], [place]    Started with sores on fingertips last spring; fingertips turn blue; and associated vitiligo over fingers for 1 year  Went to PCP who did lab March 2023 and referred to rheum  Ankles hurt  Hard to reach down due to stiffness  Fingers stiff when cold    Some vitiligo at scalp line  Denies difficulties with car; has not stairs ; getting out of bed  (Mother notes he has c/o difficulty getting out of bed)    Childhood ; had elevated liver enzymes at 2 year , 3-4 times normal; negative tests but they resolved by June before liver biopsy ; no problem since but he avoids tylenol  L knee arthroscopy    Out of school; not working    3/31/23: DIANE pos but DNA neg and PHILLIP  (SS-A, SS-B, Sm, RNP, Scl 70 and Rosy-1) negative  ESR 2  CBC, CMP normal    Fathers side has lupus    Review of Systems   Constitutional:  Negative for fever and unexpected weight change.        Gradually losing weight since 2021 (was 398 and now 299)   HENT:  Negative for mouth sores and trouble swallowing.    Eyes:  Negative for redness.   Respiratory:  Negative for cough and shortness of breath.    Cardiovascular:  Negative for chest pain.   Gastrointestinal:  Negative for constipation and diarrhea.        Heartburn   Genitourinary:  Negative for genital sores.   Skin:  Negative for rash.   Neurological:  Negative for headaches.   Hematological:  Does not bruise/bleed easily.       Objective:   /76   Pulse 91   Ht 6' (1.829 m)   Wt 135.7 kg (299 lb 2.6 oz)   BMI 40.57 kg/m²      Physical Exam   Constitutional: normal appearance.   HENT:   Nose: Nose normal.   Mouth/Throat: No ulcers  Eyes: No scleral icterus.   Cardiovascular: Normal rate and regular rhythm.   Pulmonary/Chest: He has no wheezes. He has no rales.   Abdominal: There is no abdominal tenderness.   Musculoskeletal:      Comments: No synovitis  Positive  extensor tendon rub distal anterior lower leg above ankle   Lymphadenopathy:     He has no cervical adenopathy.   Skin: No rash noted.   Salt and pepper hypopigmentation at scalp line, beard line, and over extensor surfaces of fingers                             Assessment:       1. Diffuse systemic sclerosis    2. Vitiligo    3. Raynaud's syndrome without gangrene    4. Gastroesophageal reflux disease without esophagitis    5. Class 3 severe obesity due to excess calories with serious comorbidity and body mass index (BMI) of 45.0 to 49.9 in adult            Plan:       Problem List Items Addressed This Visit          Active Problems    Diffuse systemic sclerosis - Primary     He has scleroderma changes of forearms and hands, forehead and LE below the knee, and active tendon friction rub of ankle flexors. This indicates early, active diffuse scleroderma. Additional workup is indicated to determine antibody profile and extent of systemic process including GI, lung, and cardiac involvement.     Will get lab today and schedule additional diagnostics at [facility]    Start amlodipine for Raynaud's and PPI for NAKITA    Will schedule virtual f/u 2-3 weeks to review studies; anticipate starting MMF then           Relevant Orders    Anti-scleroderma antibody    RNA polymerase III Ab, IgG    PM-Scl Antibody by Immunodiffusion    Anti Sm/RNP Antibody    Th/To Antibody    MyoMarker Panel 3    HIV 1/2 Ag/Ab (4th Gen)    Hepatitis B surface antigen    HBcAB    Hepatitis B surface antibody    Hepatitis C antibody    Strongyloides IgG Antibodies    Quantiferon Gold TB    RPR    Varicella zoster antibody, IgG    CK    Aldolase    CBC Auto Differential (Completed)    Comprehensive Metabolic Panel    C-Reactive Protein    Cyclic Citrullinated Peptide Antibody, IgG    Protein Electrophoresis, Serum    Urinalysis    DIANE Screen w/Reflex    X-Ray Chest PA And Lateral    CT Chest Without Contrast    Echo    FL Esophagram Complete    Vitiligo      This is a complication of scleroderma           Raynaud's syndrome without gangrene     Started early 2021 in conjunction with onset of systemic sclerosis with skin involvement           Relevant Medications    amLODIPine (NORVASC) 5 MG tablet    Obesity     He has lost approximately 100 lb over last 2 years; some may be from lifestyle changes but suspect some weight loss has been due to progression of scleroderma and loss of muscle mass    Advised Mediterranean style diet    Discussed exercise             Other Visit Diagnoses       Gastroesophageal reflux disease without esophagitis        Relevant Medications    pantoprazole (PROTONIX) 40 MG tablet

## 2023-05-12 NOTE — PROGRESS NOTES
Rapid3 Question Responses and Scores 5/12/2023   MDHAQ Score 0.3   Psychologic Score 0   Pain Score 0   When you awakened in the morning OVER THE LAST WEEK, did you feel stiff? No   Fatigue Score 0   Global Health Score 2   RAPID3 Score 1      Answers submitted by the patient for this visit:  Rheumatology Questionnaire (Submitted on 5/12/2023)  fever: No  eye redness: No  mouth sores: No  headaches: No  shortness of breath: No  chest pain: No  trouble swallowing: No  diarrhea: No  constipation: No  unexpected weight change: No  genital sore: No  During the last 3 days, have you had a skin rash?: No  Bruises or bleeds easily: No  cough: No

## 2023-05-12 NOTE — ASSESSMENT & PLAN NOTE
He has lost approximately 100 lb over last 2 years; some may be from lifestyle changes but suspect some weight loss has been due to progression of scleroderma and loss of muscle mass    Advised Mediterranean style diet    Discussed exercise   
He has scleroderma changes of forearms and hands, forehead and LE below the knee, and active tendon friction rub of ankle flexors. This indicates early, active diffuse scleroderma. Additional workup is indicated to determine antibody profile and extent of systemic process including GI, lung, and cardiac involvement.     Will get lab today and schedule additional diagnostics at [facility]    Start amlodipine for Raynaud's and PPI for NAKITA    Will schedule virtual f/u 2-3 weeks to review studies; anticipate starting MMF then  
Started early 2021 in conjunction with onset of systemic sclerosis with skin involvement  
This is a complication of scleroderma  
normal...

## 2023-05-13 LAB — RPR SER QL: NORMAL

## 2023-05-15 LAB
ALBUMIN SERPL ELPH-MCNC: 4.27 G/DL (ref 3.35–5.55)
ALPHA1 GLOB SERPL ELPH-MCNC: 0.3 G/DL (ref 0.17–0.41)
ALPHA2 GLOB SERPL ELPH-MCNC: 0.88 G/DL (ref 0.43–0.99)
ANA PATTERN 1: NORMAL
ANA PATTERN 2: NORMAL
ANA SER QL IF: POSITIVE
ANA TITER 2: NORMAL
ANA TITR SER IF: NORMAL {TITER}
B-GLOBULIN SERPL ELPH-MCNC: 0.91 G/DL (ref 0.5–1.1)
ENA SCL70 IGG SER IA-ACNC: <0.2 U
GAMMA GLOB SERPL ELPH-MCNC: 1.34 G/DL (ref 0.67–1.58)
PROT SERPL-MCNC: 7.7 G/DL (ref 6–8.4)
STRONGYLOIDES ANTIBODY IGG: NEGATIVE

## 2023-05-16 LAB
ALDOLASE SERPL-CCNC: 10.3 U/L (ref 1.2–7.6)
ANTI SM ANTIBODY: 0.13 RATIO (ref 0–0.99)
ANTI SM/RNP ANTIBODY: 0.08 RATIO (ref 0–0.99)
ANTI SM/RNP ANTIBODY: 0.08 RATIO (ref 0–0.99)
ANTI-SM INTERPRETATION: NEGATIVE
ANTI-SM/RNP INTERPRETATION: NEGATIVE
ANTI-SM/RNP INTERPRETATION: NEGATIVE
ANTI-SSA ANTIBODY: 0.1 RATIO (ref 0–0.99)
ANTI-SSA INTERPRETATION: NEGATIVE
ANTI-SSB ANTIBODY: 0.06 RATIO (ref 0–0.99)
ANTI-SSB INTERPRETATION: NEGATIVE
DSDNA AB SER-ACNC: NORMAL [IU]/ML
ENA SCL70 AB SER-ACNC: <0.6 U/ML
PATHOLOGIST INTERPRETATION SPE: NORMAL

## 2023-05-18 ENCOUNTER — HOSPITAL ENCOUNTER (OUTPATIENT)
Dept: RADIOLOGY | Facility: HOSPITAL | Age: 23
Discharge: HOME | End: 2023-05-18
Attending: INTERNAL MEDICINE
Payer: COMMERCIAL

## 2023-05-18 ENCOUNTER — HOSPITAL ENCOUNTER (OUTPATIENT)
Dept: CARDIOLOGY | Facility: HOSPITAL | Age: 23
Discharge: HOME | End: 2023-05-18
Attending: INTERNAL MEDICINE
Payer: COMMERCIAL

## 2023-05-18 DIAGNOSIS — M34.9 DIFFUSE SYSTEMIC SCLEROSIS: ICD-10-CM

## 2023-05-18 PROCEDURE — C8929 TTE W OR WO FOL WCON,DOPPLER: HCPCS

## 2023-05-18 PROCEDURE — 71046 X-RAY EXAM CHEST 2 VIEWS: CPT | Mod: TC

## 2023-05-18 PROCEDURE — 93306 ECHO (CUPID ONLY): ICD-10-PCS | Mod: 26,,, | Performed by: INTERNAL MEDICINE

## 2023-05-18 PROCEDURE — 71046 X-RAY EXAM CHEST 2 VIEWS: CPT | Mod: 26,,, | Performed by: RADIOLOGY

## 2023-05-18 PROCEDURE — 25500020 PHARM REV CODE 255: Performed by: INTERNAL MEDICINE

## 2023-05-18 PROCEDURE — 93306 TTE W/DOPPLER COMPLETE: CPT | Mod: 26,,, | Performed by: INTERNAL MEDICINE

## 2023-05-18 PROCEDURE — 71046 XR CHEST PA AND LATERAL: ICD-10-PCS | Mod: 26,,, | Performed by: RADIOLOGY

## 2023-05-18 RX ADMIN — PERFLUTREN 1.5 ML: 6.52 INJECTION, SUSPENSION INTRAVENOUS at 09:05

## 2023-05-19 ENCOUNTER — HOSPITAL ENCOUNTER (OUTPATIENT)
Dept: RADIOLOGY | Facility: HOSPITAL | Age: 23
Discharge: HOME | End: 2023-05-19
Attending: INTERNAL MEDICINE
Payer: COMMERCIAL

## 2023-05-19 DIAGNOSIS — M34.9 DIFFUSE SYSTEMIC SCLEROSIS: ICD-10-CM

## 2023-05-19 PROCEDURE — 74220 X-RAY XM ESOPHAGUS 1CNTRST: CPT | Mod: 26,,, | Performed by: RADIOLOGY

## 2023-05-19 PROCEDURE — 71250 CT CHEST WITHOUT CONTRAST: ICD-10-PCS | Mod: 26,,, | Performed by: RADIOLOGY

## 2023-05-19 PROCEDURE — 71250 CT THORAX DX C-: CPT | Mod: TC

## 2023-05-19 PROCEDURE — 71250 CT THORAX DX C-: CPT | Mod: 26,,, | Performed by: RADIOLOGY

## 2023-05-19 PROCEDURE — 74220 X-RAY XM ESOPHAGUS 1CNTRST: CPT | Mod: TC

## 2023-05-19 PROCEDURE — 74220 FL ESOPHAGRAM COMPLETE: ICD-10-PCS | Mod: 26,,, | Performed by: RADIOLOGY

## 2023-05-22 LAB
AORTIC ROOT ANNULUS: 2.1 CM
AORTIC VALVE CUSP SEPERATION: 1.78 CM
AV INDEX (PROSTH): 1
AV MEAN GRADIENT: 3 MMHG
AV PEAK GRADIENT: 5 MMHG
AV VALVE AREA: 3.46 CM2
AV VELOCITY RATIO: 1
CV ECHO LV RWT: 0.61 CM
DOP CALC AO PEAK VEL: 1.1 M/S
DOP CALC AO VTI: 20 CM
DOP CALC LVOT AREA: 3.5 CM2
DOP CALC LVOT DIAMETER: 2.1 CM
DOP CALC LVOT PEAK VEL: 1.1 M/S
DOP CALC LVOT STROKE VOLUME: 69.24 CM3
DOP CALCLVOT PEAK VEL VTI: 20 CM
E WAVE DECELERATION TIME: 121 MSEC
ECHO EF ESTIMATED: 55 %
ECHO LV POSTERIOR WALL: 1.36 CM (ref 0.6–1.1)
EJECTION FRACTION: 65 %
FRACTIONAL SHORTENING: 31 % (ref 28–44)
INTERVENTRICULAR SEPTUM: 1.43 CM (ref 0.6–1.1)
IVC OSTIUM: 1.4 CM
LEFT ATRIUM SIZE: 2.9 CM
LEFT INTERNAL DIMENSION IN SYSTOLE: 3.09 CM (ref 2.1–4)
LEFT VENTRICLE DIASTOLIC VOLUME: 91 ML
LEFT VENTRICLE SYSTOLIC VOLUME: 37.6 ML
LEFT VENTRICULAR INTERNAL DIMENSION IN DIASTOLE: 4.47 CM (ref 3.5–6)
LEFT VENTRICULAR MASS: 244.66 G
LVOT MG: 3 MMHG
MV PEAK E VEL: 1.02 M/S
PISA TR MAX VEL: 2.3 M/S
RA MAJOR: 5.1 CM
RA PRESSURE: 3 MMHG
RIGHT VENTRICULAR END-DIASTOLIC DIMENSION: 4.7 CM
TR MAX PG: 21 MMHG
TRICUSPID ANNULAR PLANE SYSTOLIC EXCURSION: 2.5 CM
TV REST PULMONARY ARTERY PRESSURE: 24 MMHG

## 2023-05-25 LAB — RNAP III AB SER-ACNC: <20 UNITS

## 2023-05-26 DIAGNOSIS — N52.8 OTHER MALE ERECTILE DYSFUNCTION: ICD-10-CM

## 2023-05-26 RX ORDER — SILDENAFIL 50 MG/1
50 TABLET, FILM COATED ORAL DAILY PRN
Qty: 30 TABLET | Refills: 5 | Status: SHIPPED | OUTPATIENT
Start: 2023-05-26 | End: 2023-12-04

## 2023-05-31 ENCOUNTER — OFFICE VISIT (OUTPATIENT)
Dept: FAMILY MEDICINE | Facility: CLINIC | Age: 23
End: 2023-05-31
Payer: COMMERCIAL

## 2023-05-31 VITALS
HEIGHT: 72 IN | HEART RATE: 87 BPM | RESPIRATION RATE: 18 BRPM | OXYGEN SATURATION: 97 % | SYSTOLIC BLOOD PRESSURE: 110 MMHG | TEMPERATURE: 98 F | DIASTOLIC BLOOD PRESSURE: 70 MMHG | BODY MASS INDEX: 39.14 KG/M2 | WEIGHT: 289 LBS

## 2023-05-31 DIAGNOSIS — H66.001 NON-RECURRENT ACUTE SUPPURATIVE OTITIS MEDIA OF RIGHT EAR WITHOUT SPONTANEOUS RUPTURE OF TYMPANIC MEMBRANE: ICD-10-CM

## 2023-05-31 DIAGNOSIS — J06.9 UPPER RESPIRATORY TRACT INFECTION, UNSPECIFIED TYPE: Primary | ICD-10-CM

## 2023-05-31 LAB

## 2023-05-31 PROCEDURE — 3078F PR MOST RECENT DIASTOLIC BLOOD PRESSURE < 80 MM HG: ICD-10-PCS | Mod: ICN,,, | Performed by: NURSE PRACTITIONER

## 2023-05-31 PROCEDURE — 3078F DIAST BP <80 MM HG: CPT | Mod: ICN,,, | Performed by: NURSE PRACTITIONER

## 2023-05-31 PROCEDURE — 1159F PR MEDICATION LIST DOCUMENTED IN MEDICAL RECORD: ICD-10-PCS | Mod: ICN,,, | Performed by: NURSE PRACTITIONER

## 2023-05-31 PROCEDURE — 3008F BODY MASS INDEX DOCD: CPT | Mod: ICN,,, | Performed by: NURSE PRACTITIONER

## 2023-05-31 PROCEDURE — 3074F SYST BP LT 130 MM HG: CPT | Mod: ICN,,, | Performed by: NURSE PRACTITIONER

## 2023-05-31 PROCEDURE — 1159F MED LIST DOCD IN RCRD: CPT | Mod: ICN,,, | Performed by: NURSE PRACTITIONER

## 2023-05-31 PROCEDURE — 3008F PR BODY MASS INDEX (BMI) DOCUMENTED: ICD-10-PCS | Mod: ICN,,, | Performed by: NURSE PRACTITIONER

## 2023-05-31 PROCEDURE — 99213 OFFICE O/P EST LOW 20 MIN: CPT | Mod: 25,ICN,, | Performed by: NURSE PRACTITIONER

## 2023-05-31 PROCEDURE — 1160F PR REVIEW ALL MEDS BY PRESCRIBER/CLIN PHARMACIST DOCUMENTED: ICD-10-PCS | Mod: ICN,,, | Performed by: NURSE PRACTITIONER

## 2023-05-31 PROCEDURE — 99213 PR OFFICE/OUTPT VISIT, EST, LEVL III, 20-29 MIN: ICD-10-PCS | Mod: 25,ICN,, | Performed by: NURSE PRACTITIONER

## 2023-05-31 PROCEDURE — 1160F RVW MEDS BY RX/DR IN RCRD: CPT | Mod: ICN,,, | Performed by: NURSE PRACTITIONER

## 2023-05-31 PROCEDURE — 96372 PR INJECTION,THERAP/PROPH/DIAG2ST, IM OR SUBCUT: ICD-10-PCS | Mod: ICN,,, | Performed by: NURSE PRACTITIONER

## 2023-05-31 PROCEDURE — 3074F PR MOST RECENT SYSTOLIC BLOOD PRESSURE < 130 MM HG: ICD-10-PCS | Mod: ICN,,, | Performed by: NURSE PRACTITIONER

## 2023-05-31 PROCEDURE — 96372 THER/PROPH/DIAG INJ SC/IM: CPT | Mod: ICN,,, | Performed by: NURSE PRACTITIONER

## 2023-05-31 RX ORDER — AZITHROMYCIN 250 MG/1
TABLET, FILM COATED ORAL
Qty: 6 TABLET | Refills: 0 | Status: SHIPPED | OUTPATIENT
Start: 2023-05-31 | End: 2023-05-31 | Stop reason: ALTCHOICE

## 2023-05-31 RX ORDER — CEFTRIAXONE 1 G/1
1 INJECTION, POWDER, FOR SOLUTION INTRAMUSCULAR; INTRAVENOUS
Status: COMPLETED | OUTPATIENT
Start: 2023-05-31 | End: 2023-05-31

## 2023-05-31 RX ORDER — PROMETHAZINE HYDROCHLORIDE AND DEXTROMETHORPHAN HYDROBROMIDE 6.25; 15 MG/5ML; MG/5ML
5 SYRUP ORAL NIGHTLY PRN
Qty: 118 ML | Refills: 0 | Status: SHIPPED | OUTPATIENT
Start: 2023-05-31 | End: 2023-06-10

## 2023-05-31 RX ORDER — AMOXICILLIN AND CLAVULANATE POTASSIUM 875; 125 MG/1; MG/1
1 TABLET, FILM COATED ORAL EVERY 12 HOURS
Qty: 20 TABLET | Refills: 0 | Status: SHIPPED | OUTPATIENT
Start: 2023-05-31 | End: 2023-06-16 | Stop reason: ALTCHOICE

## 2023-05-31 RX ORDER — DEXAMETHASONE SODIUM PHOSPHATE 4 MG/ML
4 INJECTION, SOLUTION INTRA-ARTICULAR; INTRALESIONAL; INTRAMUSCULAR; INTRAVENOUS; SOFT TISSUE
Status: COMPLETED | OUTPATIENT
Start: 2023-05-31 | End: 2023-05-31

## 2023-05-31 RX ADMIN — DEXAMETHASONE SODIUM PHOSPHATE 4 MG: 4 INJECTION, SOLUTION INTRA-ARTICULAR; INTRALESIONAL; INTRAMUSCULAR; INTRAVENOUS; SOFT TISSUE at 02:05

## 2023-05-31 RX ADMIN — CEFTRIAXONE 1 G: 1 INJECTION, POWDER, FOR SOLUTION INTRAMUSCULAR; INTRAVENOUS at 02:05

## 2023-05-31 NOTE — ASSESSMENT & PLAN NOTE
Rocephin and Decadron given IM in clinic   Augmentin as ordered. Ed a Hist and Promethazine DM as needed.      Pathology of current symptoms, medication side effects/risk/benefits/directions on taking medications were reviewed. Instructed patient to take cough syrup as needed. Take antibiotic as directed, complete entire course to avoid antibiotic resistance, and take OTC probiotic with antibiotic to prevent GI upset.

## 2023-05-31 NOTE — PROGRESS NOTES
Taylor Cole NP   [facility]  [address]  [place]  [address]      PATIENT NAME: Rony Silva  : 2000  DATE: 23  MRN: [id number]      Billing Provider: Taylor Cole NP  Level of Service: DE OFFICE/OUTPT VISIT, EST, LEVL III, 20-29 MIN  Patient PCP Information       Provider PCP Type    Lester Whitley,  General            Reason for Visit / Chief Complaint: Cough (Productive cough for the last week with clear sputum that has changed in color and is now green.  Normally takes Mucinex when he gets sick but it has not helped this time.), Nasal Congestion (Nasal congestion for the past week.), and Ear Fullness (Pressure to right ear that started this morning.)         History of Present Illness / Problem Focused Workflow     Rony Silva presents to the clinic with Cough (Productive cough for the last week with clear sputum that has changed in color and is now green.  Normally takes Mucinex when he gets sick but it has not helped this time.), Nasal Congestion (Nasal congestion for the past week.), and Ear Fullness (Pressure to right ear that started this morning.)     22 year old male presents to clinic with complaints of productive cough with green sputum production  and nasal congestion x 1 week, and right ear pain that started this morning. Denies fever or known sick contacts. States he has taken Mucinex at home with no improvement.       Review of Systems     @Review of Systems   Constitutional:  Negative for activity change, appetite change, fatigue and fever.   HENT:  Positive for nasal congestion, ear pain, rhinorrhea and sinus pressure/congestion. Negative for sore throat.    Eyes:  Negative for pain, redness, visual disturbance and eye dryness.   Respiratory:  Positive for cough. Negative for shortness of breath.    Cardiovascular:  Negative for chest pain and leg swelling.   Gastrointestinal:  Negative for abdominal distention, abdominal pain, constipation  and diarrhea.   Endocrine: Negative for cold intolerance, heat intolerance and polyuria.   Genitourinary:  Negative for bladder incontinence, dysuria, frequency and urgency.   Musculoskeletal:  Negative for arthralgias, gait problem and myalgias.   Integumentary:  Negative for color change, rash and wound.   Allergic/Immunologic: Negative for environmental allergies and food allergies.   Neurological:  Negative for dizziness, weakness, light-headedness and headaches.   Psychiatric/Behavioral:  Negative for behavioral problems and sleep disturbance.      Medical / Social / Family History     Past Medical History:   Diagnosis Date    Raynaud's syndrome without gangrene 03/22/2023    Vitiligo 03/22/2023       Past Surgical History:   Procedure Laterality Date    Knee Scope Left 2017       Social History    reports that he has never smoked. He has never been exposed to tobacco smoke. He has never used smokeless tobacco. He reports that he does not currently use alcohol. He reports that he does not currently use drugs.    Family History  's family history includes Diabetes in his father; Lupus in his paternal cousin; No Known Problems in his mother.    Medications and Allergies     Medications  Outpatient Medications Marked as Taking for the 5/31/23 encounter (Office Visit) with Taylor Cole NP   Medication Sig Dispense Refill    pantoprazole (PROTONIX) 40 MG tablet Take 1 tablet (40 mg total) by mouth once daily. 30 tablet 11    sildenafiL (VIAGRA) 50 MG tablet Take 1 tablet (50 mg total) by mouth daily as needed for Erectile Dysfunction. 30 tablet 5     Current Facility-Administered Medications for the 5/31/23 encounter (Office Visit) with Taylor Cole NP   Medication Dose Route Frequency Provider Last Rate Last Admin    [COMPLETED] cefTRIAXone injection 1 g  1 g Intramuscular 1 time in Clinic/HOD Taylor Cole NP   1 g at 05/31/23 1416    [COMPLETED] dexAMETHasone injection 4 mg  4 mg Intramuscular  1 time in Clinic/HOD Taylor Cole, ALTHEA   4 mg at 05/31/23 1416       Allergies  Review of patient's allergies indicates:   Allergen Reactions    Acetomenaphthone Other (See Comments)     Elevated liver enzymes when in childhood       Physical Examination     Vitals:    05/31/23 1350   BP: 110/70   Pulse: 87   Resp: 18   Temp: 97.9 °F (36.6 °C)     Physical Exam  Vitals and nursing note reviewed.   Constitutional:       Appearance: Normal appearance.   HENT:      Head: Normocephalic.      Right Ear: Tympanic membrane is injected, erythematous and bulging.      Left Ear: Tympanic membrane normal.      Nose: Congestion and rhinorrhea present. Rhinorrhea is purulent.      Right Turbinates: Pale.      Left Turbinates: Pale.      Right Sinus: Maxillary sinus tenderness and frontal sinus tenderness present.      Left Sinus: Maxillary sinus tenderness and frontal sinus tenderness present.      Mouth/Throat:      Lips: Pink.      Mouth: Mucous membranes are moist.      Pharynx: Oropharyngeal exudate (clear post nasal drainage.) and posterior oropharyngeal erythema present.   Eyes:      Conjunctiva/sclera: Conjunctivae normal.   Cardiovascular:      Rate and Rhythm: Normal rate and regular rhythm.      Pulses: Normal pulses.      Heart sounds: Normal heart sounds.   Pulmonary:      Effort: Pulmonary effort is normal.      Breath sounds: Examination of the right-upper field reveals rhonchi. Examination of the left-upper field reveals rhonchi. Rhonchi present. No wheezing.   Abdominal:      General: Abdomen is flat. Bowel sounds are normal. There is no distension.      Palpations: Abdomen is soft.      Tenderness: There is no abdominal tenderness.   Musculoskeletal:         General: Normal range of motion.      Cervical back: Normal range of motion.   Skin:     General: Skin is warm and dry.      Capillary Refill: Capillary refill takes less than 2 seconds.   Neurological:      General: No focal deficit present.      Mental  Status: He is alert and oriented to person, place, and time. Mental status is at baseline.   Psychiatric:         Mood and Affect: Mood normal.         Behavior: Behavior normal.             Lab Results   Component Value Date    WBC 11.75 05/12/2023    HGB 14.6 05/12/2023    HCT 48.8 05/12/2023    MCV 87 05/12/2023     (H) 05/12/2023        CMP  Sodium   Date Value Ref Range Status   05/12/2023 137 136 - 145 mmol/L Final     Potassium   Date Value Ref Range Status   05/12/2023 3.7 3.5 - 5.1 mmol/L Final     Chloride   Date Value Ref Range Status   05/12/2023 102 95 - 110 mmol/L Final     CO2   Date Value Ref Range Status   05/12/2023 28 23 - 29 mmol/L Final     Glucose   Date Value Ref Range Status   05/12/2023 84 70 - 110 mg/dL Final     BUN   Date Value Ref Range Status   05/12/2023 7 6 - 20 mg/dL Final     Creatinine   Date Value Ref Range Status   05/12/2023 0.6 0.5 - 1.4 mg/dL Final     Calcium   Date Value Ref Range Status   05/12/2023 9.7 8.7 - 10.5 mg/dL Final     Total Protein   Date Value Ref Range Status   05/12/2023 8.0 6.0 - 8.4 g/dL Final     Albumin   Date Value Ref Range Status   05/12/2023 4.2 3.5 - 5.2 g/dL Final     Total Bilirubin   Date Value Ref Range Status   05/12/2023 0.5 0.1 - 1.0 mg/dL Final     Comment:     For infants and newborns, interpretation of results should be based  on gestational age, weight and in agreement with clinical  observations.    Premature Infant recommended reference ranges:  Up to 24 hours.............<8.0 mg/dL  Up to 48 hours............<12.0 mg/dL  3-5 days..................<15.0 mg/dL  6-29 days.................<15.0 mg/dL       Alkaline Phosphatase   Date Value Ref Range Status   05/12/2023 77 55 - 135 U/L Final     AST   Date Value Ref Range Status   05/12/2023 24 10 - 40 U/L Final     ALT   Date Value Ref Range Status   05/12/2023 23 10 - 44 U/L Final     Anion Gap   Date Value Ref Range Status   05/12/2023 7 (L) 8 - 16 mmol/L Final     eGFR   Date  Value Ref Range Status   05/12/2023 >60.0 >60 mL/min/1.73 m^2 Final     Procedures   Assessment and Plan (including Health Maintenance)   :    Plan:           Problem List Items Addressed This Visit          ENT    Upper respiratory tract infection - Primary    Current Assessment & Plan     Rocephin and Decadron given IM in clinic   Augmentin as ordered. Ed a Hist and Promethazine DM as needed.      Pathology of current symptoms, medication side effects/risk/benefits/directions on taking medications were reviewed. Instructed patient to take cough syrup as needed. Take antibiotic as directed, complete entire course to avoid antibiotic resistance, and take OTC probiotic with antibiotic to prevent GI upset.           Relevant Medications    cefTRIAXone injection 1 g (Completed)    dexAMETHasone injection 4 mg (Completed)    chlorpheniramine-phenylephrine 4-10 mg per tablet    promethazine-dextromethorphan (PROMETHAZINE-DM) 6.25-15 mg/5 mL Syrp    amoxicillin-clavulanate 875-125mg (AUGMENTIN) 875-125 mg per tablet    Non-recurrent acute suppurative otitis media of right ear without spontaneous rupture of tympanic membrane    Current Assessment & Plan     Rocephin and Decadron given IM in clinic.   Augmentin as ordered and Ed a Hist as needed.    Reviewed pathology of current symptoms, medication side effects/risk/benefits/directions on taking medications, and worsening or persistent symptoms that require  follow up in next 2-3 days. Steroid nasal sprays or decongestant use as needed, and Tylenol or Motrin as needed for pain and/or fever and increase fluid intake. Patient was instructed to take antibiotic as directed, complete entire course to avoid antibiotic resistance, and take OTC probiotic with antibiotic to prevent GI upset. All medication benefits and risks discussed with patient. Instructed the patient to return if symptoms persist or worsen.               Relevant Medications    cefTRIAXone injection 1 g  (Completed)    chlorpheniramine-phenylephrine 4-10 mg per tablet    amoxicillin-clavulanate 875-125mg (AUGMENTIN) 875-125 mg per tablet       Health Maintenance Topics with due status: Not Due       Topic Last Completion Date    Influenza Vaccine Not Due       Future Appointments   Date Time Provider Department Center   6/6/2023  9:30 AM [facility]        Health Maintenance Due   Topic Date Due    COVID-19 Vaccine (1) Never done    HPV Vaccines (1 - Male 2-dose series) Never done    TETANUS VACCINE  Never done        No follow-ups on file.     Signature:  Taylor Cole NP  [facility]  [address] [address]  [address]    Date of encounter: 5/31/23

## 2023-05-31 NOTE — ASSESSMENT & PLAN NOTE
Rocephin and Decadron given IM in clinic.   Augmentin as ordered and Ed a Hist as needed.    Reviewed pathology of current symptoms, medication side effects/risk/benefits/directions on taking medications, and worsening or persistent symptoms that require  follow up in next 2-3 days. Steroid nasal sprays or decongestant use as needed, and Tylenol or Motrin as needed for pain and/or fever and increase fluid intake. Patient was instructed to take antibiotic as directed, complete entire course to avoid antibiotic resistance, and take OTC probiotic with antibiotic to prevent GI upset. All medication benefits and risks discussed with patient. Instructed the patient to return if symptoms persist or worsen.

## 2023-06-06 ENCOUNTER — OFFICE VISIT (OUTPATIENT)
Dept: RHEUMATOLOGY | Facility: CLINIC | Age: 23
End: 2023-06-06
Payer: COMMERCIAL

## 2023-06-06 VITALS
DIASTOLIC BLOOD PRESSURE: 69 MMHG | BODY MASS INDEX: 39.84 KG/M2 | WEIGHT: 294.13 LBS | HEIGHT: 72 IN | HEART RATE: 81 BPM | SYSTOLIC BLOOD PRESSURE: 121 MMHG

## 2023-06-06 DIAGNOSIS — Z79.899 HIGH RISK MEDICATION USE: ICD-10-CM

## 2023-06-06 DIAGNOSIS — M34.9 DIFFUSE SYSTEMIC SCLEROSIS: Primary | ICD-10-CM

## 2023-06-06 DIAGNOSIS — K22.4 ESOPHAGEAL DYSMOTILITY DUE TO SYSTEMIC DISEASE: ICD-10-CM

## 2023-06-06 DIAGNOSIS — K21.9 GASTROESOPHAGEAL REFLUX DISEASE WITHOUT ESOPHAGITIS: ICD-10-CM

## 2023-06-06 DIAGNOSIS — I73.00 RAYNAUD'S SYNDROME WITHOUT GANGRENE: ICD-10-CM

## 2023-06-06 DIAGNOSIS — J22 LOWER RESPIRATORY INFECTION: ICD-10-CM

## 2023-06-06 DIAGNOSIS — L80 VITILIGO: ICD-10-CM

## 2023-06-06 PROCEDURE — 1159F PR MEDICATION LIST DOCUMENTED IN MEDICAL RECORD: ICD-10-PCS | Mod: CPTII,S$GLB,, | Performed by: INTERNAL MEDICINE

## 2023-06-06 PROCEDURE — 3008F BODY MASS INDEX DOCD: CPT | Mod: CPTII,S$GLB,, | Performed by: INTERNAL MEDICINE

## 2023-06-06 PROCEDURE — 3078F DIAST BP <80 MM HG: CPT | Mod: CPTII,S$GLB,, | Performed by: INTERNAL MEDICINE

## 2023-06-06 PROCEDURE — 3074F PR MOST RECENT SYSTOLIC BLOOD PRESSURE < 130 MM HG: ICD-10-PCS | Mod: CPTII,S$GLB,, | Performed by: INTERNAL MEDICINE

## 2023-06-06 PROCEDURE — 1159F MED LIST DOCD IN RCRD: CPT | Mod: CPTII,S$GLB,, | Performed by: INTERNAL MEDICINE

## 2023-06-06 PROCEDURE — 99214 OFFICE O/P EST MOD 30 MIN: CPT | Mod: S$GLB,,, | Performed by: INTERNAL MEDICINE

## 2023-06-06 PROCEDURE — 3008F PR BODY MASS INDEX (BMI) DOCUMENTED: ICD-10-PCS | Mod: CPTII,S$GLB,, | Performed by: INTERNAL MEDICINE

## 2023-06-06 PROCEDURE — 3074F SYST BP LT 130 MM HG: CPT | Mod: CPTII,S$GLB,, | Performed by: INTERNAL MEDICINE

## 2023-06-06 PROCEDURE — 99999 PR PBB SHADOW E&M-EST. PATIENT-LVL III: CPT | Mod: PBBFAC,,, | Performed by: INTERNAL MEDICINE

## 2023-06-06 PROCEDURE — 99999 PR PBB SHADOW E&M-EST. PATIENT-LVL III: ICD-10-PCS | Mod: PBBFAC,,, | Performed by: INTERNAL MEDICINE

## 2023-06-06 PROCEDURE — 3078F PR MOST RECENT DIASTOLIC BLOOD PRESSURE < 80 MM HG: ICD-10-PCS | Mod: CPTII,S$GLB,, | Performed by: INTERNAL MEDICINE

## 2023-06-06 PROCEDURE — 99214 PR OFFICE/OUTPT VISIT, EST, LEVL IV, 30-39 MIN: ICD-10-PCS | Mod: S$GLB,,, | Performed by: INTERNAL MEDICINE

## 2023-06-06 RX ORDER — MYCOPHENOLATE MOFETIL 500 MG/1
TABLET ORAL
Qty: 138 TABLET | Refills: 0 | Status: ACTIVE | OUTPATIENT
Start: 2023-06-06 | End: 2023-07-21

## 2023-06-06 RX ORDER — PANTOPRAZOLE SODIUM 40 MG/1
40 TABLET, DELAYED RELEASE ORAL DAILY
Qty: 30 TABLET | Refills: 11 | Status: SHIPPED | OUTPATIENT
Start: 2023-06-06 | End: 2024-02-04 | Stop reason: SDUPTHER

## 2023-06-06 RX ORDER — MUPIROCIN 20 MG/G
OINTMENT TOPICAL 3 TIMES DAILY
Qty: 15 G | Refills: 2 | Status: SHIPPED | OUTPATIENT
Start: 2023-06-06 | End: 2023-12-04

## 2023-06-06 RX ORDER — MYCOPHENOLATE MOFETIL 500 MG/1
1500 TABLET ORAL 2 TIMES DAILY
Qty: 180 TABLET | Refills: 1 | Status: ACTIVE | OUTPATIENT
Start: 2023-07-05 | End: 2023-11-24 | Stop reason: SDUPTHER

## 2023-06-06 ASSESSMENT — ROUTINE ASSESSMENT OF PATIENT INDEX DATA (RAPID3)
AM STIFFNESS SCORE: 0, NO
TOTAL RAPID3 SCORE: 1.44
PAIN SCORE: 0
PATIENT GLOBAL ASSESSMENT SCORE: 3
FATIGUE SCORE: 0
MDHAQ FUNCTION SCORE: 0.4
PSYCHOLOGICAL DISTRESS SCORE: 1.1

## 2023-06-06 NOTE — ASSESSMENT & PLAN NOTE
No markers for poor prognosis but rapid progression of skin over last year  eval shows esoph dysmotility that is minimally symptomatic  Will  start  MMF now  Will continue PPI and amlodipine  Plan lab in 1 and 3 mo and RTC me 3 mo

## 2023-06-06 NOTE — PROGRESS NOTES
Rapid3 Question Responses and Scores 5/31/2023   MDHAQ Score 0.4   Psychologic Score 1.1   Pain Score 0   When you awakened in the morning OVER THE LAST WEEK, did you feel stiff? No   Fatigue Score 0   Global Health Score 3   RAPID3 Score 1.44      Answers submitted by the patient for this visit:  Rheumatology Questionnaire (Submitted on 5/31/2023)  fever: No  eye redness: No  mouth sores: No  headaches: No  shortness of breath: No  chest pain: No  trouble swallowing: No  diarrhea: No  constipation: No  unexpected weight change: No  genital sore: No  During the last 3 days, have you had a skin rash?: No  Bruises or bleeds easily: No  cough: Yes

## 2023-06-06 NOTE — PROGRESS NOTES
Subjective:       Patient ID: Rony Silva is a 22 y.o. male.    Chief Complaint: Disease Management    HPI    May 19 he had scans and dveloped symptoms of LRI with cough ; PCP gave antibiotics and symptoms have cleared    Esophagram showed dysmotility lower esophagus  CT showed RLL pneumonia but no interstitial lung disease    Review of Systems   Constitutional:  Negative for fever and unexpected weight change.   HENT:  Negative for mouth sores and trouble swallowing.    Eyes:  Negative for redness.   Respiratory:  Positive for cough. Negative for shortness of breath.    Cardiovascular:  Negative for chest pain.   Gastrointestinal:  Negative for constipation and diarrhea.   Genitourinary:  Negative for genital sores.   Skin:  Negative for rash.   Neurological:  Negative for headaches.   Hematological:  Does not bruise/bleed easily.       Objective:   /69   Pulse 81   Ht 6' (1.829 m)   Wt 133.4 kg (294 lb 1.5 oz)   BMI 39.89 kg/m²      Physical Exam      Assessment:       1. Diffuse systemic sclerosis    2. Gastroesophageal reflux disease without esophagitis    3. Vitiligo    4. Lower respiratory infection    5. Raynaud's syndrome without gangrene    6. Esophageal dysmotility due to systemic disease    7. High risk medication use            Plan:       Problem List Items Addressed This Visit          Active Problems    Diffuse systemic sclerosis - Primary     No markers for poor prognosis but rapid progression of skin over last year  eval shows esoph dysmotility that is minimally symptomatic  Will  start  MMF now  Will continue PPI and amlodipine  Plan lab in 1 and 3 mo and RTC me 3 mo         Relevant Medications    mycophenolate (CELLCEPT) 500 mg Tab    mycophenolate (CELLCEPT) 500 mg Tab (Start on 7/5/2023)    Other Relevant Orders    CK    Sedimentation rate    C-Reactive Protein    Vitiligo     Due to scleroderma; will follow         Raynaud's syndrome without gangrene     Stable with minimal dry  pits; no ulcers    Continue amlodipine and use bactroban prn         Relevant Medications    mupirocin (BACTROBAN) 2 % ointment    Lower respiratory infection     Symptoms have resolved with antibiotics         Esophageal dysmotility due to systemic disease     Rare dysphagia and no heartburn but PPI advised         Relevant Medications    pantoprazole (PROTONIX) 40 MG tablet     Other Visit Diagnoses       Gastroesophageal reflux disease without esophagitis        Relevant Medications    pantoprazole (PROTONIX) 40 MG tablet    High risk medication use        Relevant Orders    Comprehensive Metabolic Panel    CBC Auto Differential        Discussed work, school , and football  He was a  and I advised against high impact training and sports.   I recommend he increase low impact exercise and look at school or non-physical job options

## 2023-06-12 ENCOUNTER — SPECIALTY PHARMACY (OUTPATIENT)
Dept: PHARMACY | Facility: CLINIC | Age: 23
End: 2023-06-12
Payer: COMMERCIAL

## 2023-06-12 NOTE — TELEPHONE ENCOUNTER
New Rx received for mycophenolate. No PA required. Test claim: $45 copay for generic. Pt has [email or web address] insurance. No FA available.

## 2023-06-12 NOTE — TELEPHONE ENCOUNTER
Rodrigo, this is Margaret Samuel, clinical pharmacist with [facility] that is part of your care team.  We have begun working on your prescription that your doctor has sent us for Mycophenolate. Our next steps include:     Working with your insurance company to obtain approval for your medication  Working with you to ensure your medication is affordable     We will be calling you along the way with updates on your medication but if you have any concerns or receive information that you would like to discuss please reach us at [phone].    Welcome call outcome: Left voicemail

## 2023-06-16 ENCOUNTER — SPECIALTY PHARMACY (OUTPATIENT)
Dept: PHARMACY | Facility: CLINIC | Age: 23
End: 2023-06-16
Payer: COMMERCIAL

## 2023-06-16 DIAGNOSIS — M34.9 DIFFUSE SYSTEMIC SCLEROSIS: Primary | ICD-10-CM

## 2023-06-16 NOTE — TELEPHONE ENCOUNTER
Specialty Pharmacy - Initial Clinical Assessment    Specialty Medication Orders Linked to Encounter      Flowsheet Row Most Recent Value   Medication #1 mycophenolate (CELLCEPT) 500 mg Tab (Order#[id number], Rx#[id number])   Medication #2 mycophenolate (CELLCEPT) 500 mg Tab (Order#[id number], Rx#[id number])          Patient Diagnosis   M34.9 - Diffuse systemic sclerosis    Subjective    Rony Silva is a 22 y.o. male, who is followed by the specialty pharmacy service for management and education.    Recent Encounters       Date Type Provider Description    06/16/2023 Specialty Pharmacy Margaret Samuel, Caterina Initial Clinical Assessment    06/12/2023 Specialty Pharmacy Margaret Samuel, PharmD Referral Authorization            Current Outpatient Medications   Medication Sig    amLODIPine (NORVASC) 5 MG tablet Take 1 tablet (5 mg total) by mouth once daily.    mupirocin (BACTROBAN) 2 % ointment Apply topically 3 (three) times daily.    mycophenolate (CELLCEPT) 500 mg Tab Take 1 tablet (500 mg total) by mouth 2 (two) times daily for 7 days, THEN 2 tablets (1,000 mg total) 2 (two) times daily for 7 days, THEN 3 tablets (1,500 mg total) 2 (two) times daily for 16 days.    [START ON 7/5/2023] mycophenolate (CELLCEPT) 500 mg Tab Take 3 tablets (1,500 mg total) by mouth 2 (two) times daily.    pantoprazole (PROTONIX) 40 MG tablet Take 1 tablet (40 mg total) by mouth once daily.    sildenafiL (VIAGRA) 50 MG tablet Take 1 tablet (50 mg total) by mouth daily as needed for Erectile Dysfunction.   Last reviewed on 6/16/2023  4:16 PM by Margaret Samuel, PharmD    Review of patient's allergies indicates:   Allergen Reactions    Acetomenaphthone Other (See Comments)     Elevated liver enzymes when in childhood   Last reviewed on  6/16/2023 4:16 PM by Margaret Samuel          Assessment Questions - Documented Responses      Flowsheet Row Most Recent Value   Assessment    Medication Reconciliation completed for patient Yes   During  the past 4 weeks, has patient missed any activities due to condition or medication? No   During the past 4 weeks, did patient have any of the following urgent care visits? None   Goals of Therapy Status Discussed (new start)   Status of the patients ability to self-administer: Is Able   All education points have been covered with patient? Yes, supplemental printed education provided   Welcome packet contents reviewed and discussed with patient? Yes   Assesment completed? Yes   Plan Therapy being initiated   Do you need to open a clinical intervention (i-vent)? No   Do you want to schedule first shipment? Yes   Medication #1 Assessment Info    Patient status New medication, New to [facility]   Is this medication appropriate for the patient? Yes   Is this medication effective? Not yet started          Refill Questions - Documented Responses      Flowsheet Row Most Recent Value   Patient Availability and HIPAA Verification    Does patient want to proceed with activity? Yes   HIPAA/medical authority confirmed? Yes   Relationship to patient of person spoken to? Self   Refill Screening Questions    When does the patient need to receive the medication? 06/20/23   Refill Delivery Questions    How will the patient receive the medication? Mail   When does the patient need to receive the medication? 06/20/23   Shipping Address Home   Address in [facility] confirmed and updated if neccessary? Yes   Expected Copay ($) 45   Is the patient able to afford the medication copay? Yes   Payment Method new CC added to file   Days supply of Refill 30   Supplies needed? No supplies needed   Refill activity completed? Yes   Refill activity plan Refill scheduled   Shipment/Pickup Date: 06/19/23            Objective    He has a past medical history of Raynaud's syndrome without gangrene (03/22/2023) and Vitiligo (03/22/2023).    Tried/failed medications: amlodipine, sildenafil, mupirocin    BP Readings from Last 4 Encounters:   06/06/23  121/69   05/31/23 110/70   05/12/23 121/76   03/31/23 110/62     Ht Readings from Last 4 Encounters:   06/06/23 6' (1.829 m)   05/31/23 6' (1.829 m)   05/12/23 6' (1.829 m)   03/31/23 6' (1.829 m)     Wt Readings from Last 4 Encounters:   06/06/23 133.4 kg (294 lb 1.5 oz)   05/31/23 131.1 kg (289 lb)   05/12/23 135.7 kg (299 lb 2.6 oz)   03/31/23 135.2 kg (298 lb)       The goals of prescribed drug therapy management include:  Supporting patient to meet the prescriber's medical treatment objectives  Improving or maintaining quality of life  Maintaining optimal therapy adherence  Minimizing and managing side effects      Goals of Therapy Status: Discussed (new start)    Assessment/Plan  Patient plans to start therapy on 06/20/23      Indication, dosage, appropriateness, effectiveness, safety and convenience of his specialty medication(s) were reviewed today.     Patient Education   Patient received education on the following:   Expectations and possible outcomes of therapy  Proper use, timely administration, and missed dose management  Duration of therapy  Side effects, including prevention, minimization, and management  Contraindications and safety precautions  New or changed medications, including prescribe and over the counter medications and supplements  Reviews recommended vaccinations, as appropriate  Storage, safe handling, and disposal      Tasks added this encounter   No tasks added.   Tasks due within next 3 months   No tasks due.     Margaret Samuel, PharmD  [facility]  [address] [place] [id number]  Phone: [phone]  Fax: [phone]

## 2023-06-20 ENCOUNTER — TELEPHONE (OUTPATIENT)
Dept: PHARMACY | Facility: CLINIC | Age: 23
End: 2023-06-20
Payer: COMMERCIAL

## 2023-06-20 NOTE — TELEPHONE ENCOUNTER
Lvm for patient regarding mycophenolate shipment.  Cc is declining, please obtain new card to avoid a further delay in shipment.

## 2023-07-14 ENCOUNTER — PATIENT MESSAGE (OUTPATIENT)
Dept: PHARMACY | Facility: CLINIC | Age: 23
End: 2023-07-14
Payer: COMMERCIAL

## 2023-07-24 DIAGNOSIS — I73.00 RAYNAUD'S SYNDROME WITHOUT GANGRENE: ICD-10-CM

## 2023-07-28 RX ORDER — AMLODIPINE BESYLATE 5 MG/1
5 TABLET ORAL DAILY
Qty: 30 TABLET | Refills: 5 | Status: SHIPPED | OUTPATIENT
Start: 2023-07-28 | End: 2023-12-01 | Stop reason: SDUPTHER

## 2023-07-31 ENCOUNTER — SPECIALTY PHARMACY (OUTPATIENT)
Dept: PHARMACY | Facility: CLINIC | Age: 23
End: 2023-07-31
Payer: COMMERCIAL

## 2023-08-11 NOTE — TELEPHONE ENCOUNTER
Specialty Pharmacy - Refill Coordination    Specialty Medication Orders Linked to Encounter      Flowsheet Row Most Recent Value   Medication #1 mycophenolate (CELLCEPT) 500 mg Tab (Order#[id number], Rx#[id number])          Refill Questions - Documented Responses      Flowsheet Row Most Recent Value   Patient Availability and HIPAA Verification    Does patient want to proceed with activity? Unable to Reach          We have had multiple attempts to the patient and have been unsuccessful to reach the patient. We will stop reaching out to the patient but in the event that the patient needs the med and contacts us, we will communicate and begin dispensing for the patient. At your next visit with the patient, please review the importance of being in contact with our specialty pharmacy as a part of our care team.      Rosemarie Mckeon, PharmD  [facility]  [address] [facility] [address]  Phone: [phone]  Fax: [phone]

## 2023-08-12 ENCOUNTER — PATIENT MESSAGE (OUTPATIENT)
Dept: RHEUMATOLOGY | Facility: CLINIC | Age: 23
End: 2023-08-12
Payer: COMMERCIAL

## 2023-09-29 ENCOUNTER — OFFICE VISIT (OUTPATIENT)
Dept: RHEUMATOLOGY | Facility: CLINIC | Age: 23
End: 2023-09-29
Payer: COMMERCIAL

## 2023-09-29 DIAGNOSIS — M34.9 DIFFUSE SYSTEMIC SCLEROSIS: Primary | ICD-10-CM

## 2023-09-29 DIAGNOSIS — I73.01 RAYNAUD'S PHENOMENON WITH GANGRENE: ICD-10-CM

## 2023-09-29 DIAGNOSIS — Z79.899 HIGH RISK MEDICATION USE: ICD-10-CM

## 2023-09-29 PROCEDURE — 99214 PR OFFICE/OUTPT VISIT, EST, LEVL IV, 30-39 MIN: ICD-10-PCS | Mod: 95,,, | Performed by: INTERNAL MEDICINE

## 2023-09-29 PROCEDURE — 1160F RVW MEDS BY RX/DR IN RCRD: CPT | Mod: CPTII,95,, | Performed by: INTERNAL MEDICINE

## 2023-09-29 PROCEDURE — 1159F MED LIST DOCD IN RCRD: CPT | Mod: CPTII,95,, | Performed by: INTERNAL MEDICINE

## 2023-09-29 PROCEDURE — 1160F PR REVIEW ALL MEDS BY PRESCRIBER/CLIN PHARMACIST DOCUMENTED: ICD-10-PCS | Mod: CPTII,95,, | Performed by: INTERNAL MEDICINE

## 2023-09-29 PROCEDURE — 1159F PR MEDICATION LIST DOCUMENTED IN MEDICAL RECORD: ICD-10-PCS | Mod: CPTII,95,, | Performed by: INTERNAL MEDICINE

## 2023-09-29 PROCEDURE — 99214 OFFICE O/P EST MOD 30 MIN: CPT | Mod: 95,,, | Performed by: INTERNAL MEDICINE

## 2023-09-29 ASSESSMENT — ROUTINE ASSESSMENT OF PATIENT INDEX DATA (RAPID3)
PATIENT GLOBAL ASSESSMENT SCORE: 2
FATIGUE SCORE: 0
PSYCHOLOGICAL DISTRESS SCORE: 1.1
MDHAQ FUNCTION SCORE: 0.6
TOTAL RAPID3 SCORE: 2.67
PAIN SCORE: 4

## 2023-09-29 NOTE — PROGRESS NOTES
9/29/2023     8:34 AM   Rapid3 Question Responses and Scores   MDHAQ Score 0.6   Psychologic Score 1.1   Pain Score 4   When you awakened in the morning OVER THE LAST WEEK, did you feel stiff? Yes   If Yes, please indicate the number of hours until you are as limber as you will be for the day 1   Fatigue Score 0   Global Health Score 2   RAPID3 Score 2.67     Answers submitted by the patient for this visit:  Rheumatology Questionnaire (Submitted on 9/29/2023)  fever: No  eye redness: No  mouth sores: No  headaches: No  shortness of breath: No  chest pain: No  trouble swallowing: Yes  diarrhea: No  constipation: No  unexpected weight change: No  genital sore: No  During the last 3 days, have you had a skin rash?: No  Bruises or bleeds easily: No  cough: No

## 2023-09-29 NOTE — ASSESSMENT & PLAN NOTE
telemed visit indicates that he is tolerating mycophenolate mofetil and is subjectively stable with some constitutional fatigue, ongoing swallowing limitation, and persistent skin change that he does not report as progressing    He will  Need to return for physical exam but in the meantime we will plan lab in [place] to monitor med toxicity and inflammatory markers    He will likely be a canditate for TULIP study of anifrolumab in systemic sclerosis when we initiate the trial later this year    For now continue current mycophenolate mofetil and amlodipine and PPI  Add ASA 81 mg/d for fingers  Get lab in [place] now  Schedule PFT in [place]  Schedule follow up lab and office visit in 3 mo; will reach out to him sooner if able to schedule screening exam for MARCIA Painter to use NSAID prn ankle arthritis

## 2023-09-29 NOTE — PROGRESS NOTES
Subjective:       Patient ID: Rony Silva is a 22 y.o. male.    Chief Complaint: Disease Management    HPI    From [place], [place]   Presented May 2023 with 1 year history Raynaud's and progressive skin changes , RSS 14  Negative Scl70, RNA abilio, centromere, RNP Ab  +esophageal dysmotility  - interstitial lung disease on CT   Start MMF June 2023  associated vitiligo over fingers for 1 year   Some vitiligo at scalp line  Initial eval  with minimal subjective weakness but Mother noted he had c/o difficulty getting out of bed     Childhood ; had elevated liver enzymes at 2 year , 3-4 times normal; negative tests but they resolved by June before liver biopsy ; no problem since but he avoids tylenol  L knee arthroscopy     3/31/23: DIANE pos but DNA neg and PHILLIP  (SS-A, SS-B, Sm, RNP, Scl 70 and Rosy-1) negative  ESR 2  CBC, CMP normal     Fathers side has lupus     LV 6/6/23  The patient location is: [place]  The chief complaint leading to consultation is: scleroderma    Visit type: audiovisual    Face to Face time with patient: 16 min  30 minutes of total time spent on the encounter, which includes face to face time and non-face to face time preparing to see the patient (eg, review of tests), Obtaining and/or reviewing separately obtained history, Documenting clinical information in the electronic or other health record, Independently interpreting results (not separately reported) and communicating results to the patient/family/caregiver, or Care coordination (not separately reported).         Each patient to whom he or she provides medical services by telemedicine is:  (1) informed of the relationship between the physician and patient and the respective role of any other health care provider with respect to management of the patient; and (2) notified that he or she may decline to receive medical services by telemedicine and may withdraw from such care at any time.    Notes:         Esophagram showed dysmotility lower  esophagus  CT showed RLL pneumonia but no interstitial lung disease    Taking mycophenolate mofetil daily; on second bottle  Taking 3 bid; 1500 bid    Amlodipine 5 mg/d    Feels about the same  Fingers not turning blue as much   No new skin lesions on fingers; existing ulcer maybe smaller  Tightness in armpits; limits shoulder elevation  Elbow contracture unchanged    Ankle pain and swelling sometimes  Some exercise  Energy sometimes low; some days   Not in school    Review of Systems   Constitutional:  Negative for fever and unexpected weight change.   HENT:  Positive for trouble swallowing. Negative for mouth sores.         Trouble swallowing with bread or meat; sometimes some heartburn with hamburger or sausage   Eyes:  Negative for redness.   Respiratory:  Negative for cough and shortness of breath.    Cardiovascular:  Negative for chest pain.   Gastrointestinal:  Negative for constipation and diarrhea.   Genitourinary:  Negative for genital sores.   Skin:  Negative for rash.        Dry skin lower abdomen and back improved with Aveeno lotion   Neurological:  Negative for headaches.   Hematological:  Does not bruise/bleed easily.           9/29/2023     8:34 AM   Rapid3 Question Responses and Scores   MDHAQ Score 0.6   Psychologic Score 1.1   Pain Score 4   When you awakened in the morning OVER THE LAST WEEK, did you feel stiff? Yes   If Yes, please indicate the number of hours until you are as limber as you will be for the day 1   Fatigue Score 0   Global Health Score 2   RAPID3 Score 2.67      Objective:   There were no vitals taken for this visit.     Physical Exam      Assessment:       1. Diffuse systemic sclerosis    2. Raynaud's phenomenon with gangrene    3. High risk medication use            Plan:       Problem List Items Addressed This Visit          Active Problems    Diffuse systemic sclerosis - Primary     telemed visit indicates that he is tolerating mycophenolate mofetil and is subjectively stable  with some constitutional fatigue, ongoing swallowing limitation, and persistent skin change that he does not report as progressing    He will  Need to return for physical exam but in the meantime we will plan lab in [place] to monitor med toxicity and inflammatory markers    He will likely be a canditate for TULIP study of anifrolumab in systemic sclerosis when we initiate the trial later this year    For now continue current mycophenolate mofetil and amlodipine and PPI  Add ASA 81 mg/d for fingers  Get lab in [place] now  Schedule PFT in [place]  Schedule follow up lab and office visit in 3 mo; will reach out to him sooner if able to schedule screening exam for MARCIA Painter to use NSAID prn ankle arthritis           Raynaud's phenomenon with gangrene     Says that one skin lesion persists without enlargement          High risk medication use     Needs cbc and CMP q3m while on mycophenolate mofetil   Will schedule in [place]

## 2023-09-29 NOTE — Clinical Note
Call him and Schedule cbc/CMP/sedimentation rate/CRP/CPK in [place] next week Also schedule pft in [place] Schedule in person follow up visit in 3 mo with repeat labs

## 2023-11-08 ENCOUNTER — HOSPITAL ENCOUNTER (EMERGENCY)
Facility: HOSPITAL | Age: 23
Discharge: HOME | End: 2023-11-08
Payer: COMMERCIAL

## 2023-11-08 VITALS
HEIGHT: 72 IN | TEMPERATURE: 98 F | RESPIRATION RATE: 20 BRPM | OXYGEN SATURATION: 97 % | DIASTOLIC BLOOD PRESSURE: 60 MMHG | HEART RATE: 90 BPM | SYSTOLIC BLOOD PRESSURE: 135 MMHG | WEIGHT: 260 LBS | BODY MASS INDEX: 35.21 KG/M2

## 2023-11-08 DIAGNOSIS — S32.10XS CLOSED FRACTURE OF SACRUM AND COCCYX, SEQUELA: ICD-10-CM

## 2023-11-08 DIAGNOSIS — S32.2XXS CLOSED FRACTURE OF SACRUM AND COCCYX, SEQUELA: ICD-10-CM

## 2023-11-08 DIAGNOSIS — M25.551 PAIN OF RIGHT HIP: Primary | ICD-10-CM

## 2023-11-08 DIAGNOSIS — L02.415 ABSCESS OF RIGHT HIP: ICD-10-CM

## 2023-11-08 LAB
ALBUMIN SERPL BCP-MCNC: 3.9 G/DL (ref 3.5–5)
ALBUMIN/GLOB SERPL: 1 {RATIO}
ALP SERPL-CCNC: 89 U/L (ref 45–115)
ALT SERPL W P-5'-P-CCNC: 28 U/L (ref 16–61)
ANION GAP SERPL CALCULATED.3IONS-SCNC: 11 MMOL/L (ref 7–16)
AST SERPL W P-5'-P-CCNC: 23 U/L (ref 15–37)
BASOPHILS # BLD AUTO: 0.05 K/UL (ref 0–0.2)
BASOPHILS NFR BLD AUTO: 0.5 % (ref 0–1)
BILIRUB SERPL-MCNC: 0.4 MG/DL (ref ?–1.2)
BUN SERPL-MCNC: 9 MG/DL (ref 7–18)
BUN/CREAT SERPL: 21 (ref 6–20)
CALCIUM SERPL-MCNC: 9.1 MG/DL (ref 8.5–10.1)
CHLORIDE SERPL-SCNC: 102 MMOL/L (ref 98–107)
CO2 SERPL-SCNC: 31 MMOL/L (ref 21–32)
CREAT SERPL-MCNC: 0.42 MG/DL (ref 0.7–1.3)
DIFFERENTIAL METHOD BLD: ABNORMAL
EGFR (NO RACE VARIABLE) (RUSH/TITUS): 157 ML/MIN/1.73M2
EOSINOPHIL # BLD AUTO: 0.28 K/UL (ref 0–0.5)
EOSINOPHIL NFR BLD AUTO: 2.7 % (ref 1–4)
ERYTHROCYTE [DISTWIDTH] IN BLOOD BY AUTOMATED COUNT: 13.5 % (ref 11.5–14.5)
GLOBULIN SER-MCNC: 4 G/DL (ref 2–4)
GLUCOSE SERPL-MCNC: 88 MG/DL (ref 74–106)
HCT VFR BLD AUTO: 47.2 % (ref 40–54)
HGB BLD-MCNC: 14.7 G/DL (ref 13.5–18)
LACTATE SERPL-SCNC: 0.5 MMOL/L (ref 0.4–2)
LYMPHOCYTES # BLD AUTO: 1.73 K/UL (ref 1–4.8)
LYMPHOCYTES NFR BLD AUTO: 16.7 % (ref 27–41)
MCH RBC QN AUTO: 26 PG (ref 27–31)
MCHC RBC AUTO-ENTMCNC: 31.1 G/DL (ref 32–36)
MCV RBC AUTO: 83.4 FL (ref 80–96)
MONOCYTES # BLD AUTO: 1.29 K/UL (ref 0–0.8)
MONOCYTES NFR BLD AUTO: 12.4 % (ref 2–6)
MPC BLD CALC-MCNC: 9.8 FL (ref 9.4–12.4)
NEUTROPHILS # BLD AUTO: 7.02 K/UL (ref 1.8–7.7)
NEUTROPHILS NFR BLD AUTO: 67.7 % (ref 53–65)
PLATELET # BLD AUTO: 469 K/UL (ref 150–400)
POTASSIUM SERPL-SCNC: 4.4 MMOL/L (ref 3.5–5.1)
PROT SERPL-MCNC: 7.9 G/DL (ref 6.4–8.2)
RBC # BLD AUTO: 5.66 M/UL (ref 4.6–6.2)
SODIUM SERPL-SCNC: 140 MMOL/L (ref 136–145)
WBC # BLD AUTO: 10.37 K/UL (ref 4.5–11)

## 2023-11-08 PROCEDURE — 87070 CULTURE OTHR SPECIMN AEROBIC: CPT

## 2023-11-08 PROCEDURE — 83605 ASSAY OF LACTIC ACID: CPT

## 2023-11-08 PROCEDURE — 25000003 PHARM REV CODE 250

## 2023-11-08 PROCEDURE — 99285 EMERGENCY DEPT VISIT HI MDM: CPT | Mod: 25

## 2023-11-08 PROCEDURE — 96375 TX/PRO/DX INJ NEW DRUG ADDON: CPT

## 2023-11-08 PROCEDURE — 96365 THER/PROPH/DIAG IV INF INIT: CPT

## 2023-11-08 PROCEDURE — 80053 COMPREHEN METABOLIC PANEL: CPT

## 2023-11-08 PROCEDURE — 99284 PR EMERGENCY DEPT VISIT,LEVEL IV: ICD-10-PCS | Mod: ,,,

## 2023-11-08 PROCEDURE — 63600175 PHARM REV CODE 636 W HCPCS

## 2023-11-08 PROCEDURE — 99284 EMERGENCY DEPT VISIT MOD MDM: CPT | Mod: ,,,

## 2023-11-08 PROCEDURE — 85025 COMPLETE CBC W/AUTO DIFF WBC: CPT

## 2023-11-08 RX ORDER — HYDROMORPHONE HYDROCHLORIDE 2 MG/1
2 TABLET ORAL EVERY 12 HOURS PRN
COMMUNITY

## 2023-11-08 RX ORDER — MORPHINE SULFATE 4 MG/ML
1 INJECTION, SOLUTION INTRAMUSCULAR; INTRAVENOUS
Status: COMPLETED | OUTPATIENT
Start: 2023-11-08 | End: 2023-11-08

## 2023-11-08 RX ORDER — MUPIROCIN 20 MG/G
OINTMENT TOPICAL 3 TIMES DAILY
Qty: 1 G | Refills: 0 | Status: SHIPPED | OUTPATIENT
Start: 2023-11-08

## 2023-11-08 RX ORDER — TRAMADOL HYDROCHLORIDE 50 MG/1
50 TABLET ORAL EVERY 6 HOURS PRN
Qty: 12 TABLET | Refills: 0 | Status: SHIPPED | OUTPATIENT
Start: 2023-11-08

## 2023-11-08 RX ORDER — CEPHALEXIN 250 MG/1
500 CAPSULE ORAL EVERY 12 HOURS
Qty: 28 CAPSULE | Refills: 0 | Status: SHIPPED | OUTPATIENT
Start: 2023-11-08 | End: 2023-11-13 | Stop reason: ALTCHOICE

## 2023-11-08 RX ORDER — ASPIRIN 81 MG/1
81 TABLET ORAL DAILY
COMMUNITY

## 2023-11-08 RX ORDER — NALOXONE HYDROCHLORIDE 4 MG/.1ML
1 SPRAY NASAL ONCE
COMMUNITY

## 2023-11-08 RX ORDER — ONDANSETRON 2 MG/ML
4 INJECTION INTRAMUSCULAR; INTRAVENOUS
Status: COMPLETED | OUTPATIENT
Start: 2023-11-08 | End: 2023-11-08

## 2023-11-08 RX ORDER — IBUPROFEN 600 MG/1
600 TABLET ORAL EVERY 6 HOURS PRN
Qty: 20 TABLET | Refills: 0 | Status: SHIPPED | OUTPATIENT
Start: 2023-11-08

## 2023-11-08 RX ORDER — GABAPENTIN 100 MG/1
100 CAPSULE ORAL 3 TIMES DAILY
COMMUNITY
End: 2023-12-04

## 2023-11-08 RX ADMIN — MORPHINE SULFATE 1 MG: 4 INJECTION, SOLUTION INTRAMUSCULAR; INTRAVENOUS at 03:11

## 2023-11-08 RX ADMIN — CEFTRIAXONE 1 G: 1 INJECTION, POWDER, FOR SOLUTION INTRAMUSCULAR; INTRAVENOUS at 04:11

## 2023-11-08 RX ADMIN — ONDANSETRON 4 MG: 2 INJECTION INTRAMUSCULAR; INTRAVENOUS at 03:11

## 2023-11-08 NOTE — ED PROVIDER NOTES
Encounter Date: 11/8/2023       History     Chief Complaint   Patient presents with    Hip Pain    HIP WOUND     Patient is a 23 y/o AAM who presents to the Emergency Department with c/o right hip pain. Patient was seen at [facility] on 1/20/2023 after a car fell on his right hip while working, patient was diagnosed with sacrum fracture and discharged home. Patient has a follow up Ortho appointment at [facility] on 11/22/2023, however mother endorsed an increase in pain last night with foul smelling odor and discharge from wound on right hip. Patient has a PMHx of Sclerodema.           Review of patient's allergies indicates:   Allergen Reactions    Acetomenaphthone Other (See Comments)     Elevated liver enzymes when in childhood     Past Medical History:   Diagnosis Date    Raynaud's syndrome without gangrene 03/22/2023    Vitiligo 03/22/2023     Past Surgical History:   Procedure Laterality Date    Knee Scope Left 2017     Family History   Problem Relation Age of Onset    No Known Problems Mother     Diabetes Father     Lupus Paternal Cousin      Social History     Tobacco Use    Smoking status: Never     Passive exposure: Never    Smokeless tobacco: Never   Substance Use Topics    Alcohol use: Not Currently    Drug use: Not Currently     Review of Systems   Constitutional: Negative.    HENT: Negative.     Eyes: Negative.    Respiratory: Negative.     Cardiovascular: Negative.    Gastrointestinal: Negative.    Endocrine: Negative.    Musculoskeletal:  Positive for gait problem.   Skin:  Positive for wound.        Ulcer noted to right hip   Allergic/Immunologic: Negative.    Hematological: Negative.    Psychiatric/Behavioral: Negative.           Physical Exam     Initial Vitals [11/08/23 1405]   BP Pulse Resp Temp SpO2   135/60 90 18 98.3 °F (36.8 °C) 97 %      MAP       --         Physical Exam    Nursing note and vitals reviewed.  Constitutional: Vital signs are normal. He appears well-developed and  well-nourished. He is not diaphoretic. He is cooperative.  Non-toxic appearance. He does not have a sickly appearance. He does not appear ill. No distress.   Cardiovascular:  Normal rate, regular rhythm, S1 normal, S2 normal, normal heart sounds, intact distal pulses and normal pulses.     Exam reveals no gallop, no S3, no S4, no distant heart sounds and no friction rub.       No murmur heard.  No systolic murmur is present.  Pulmonary/Chest: Effort normal and breath sounds normal.   Abdominal: Abdomen is soft and flat. Bowel sounds are normal. There is no abdominal tenderness. No hernia.   Musculoskeletal:      Right hip: Tenderness present. No deformity, lacerations, bony tenderness or crepitus. Decreased range of motion. Normal strength.     Lymphadenopathy:     He has no cervical adenopathy.     He has no axillary adenopathy.   Neurological: He is alert and oriented to person, place, and time. He has normal strength and normal reflexes. He displays normal reflexes. No cranial nerve deficit or sensory deficit. He displays a negative Romberg sign. GCS eye subscore is 4. GCS verbal subscore is 5. GCS motor subscore is 6.   Skin: Abscess noted.        Psychiatric: He has a normal mood and affect. His speech is normal and behavior is normal. Judgment and thought content normal. Cognition and memory are normal.         Medical Screening Exam   See Full Note    ED Course   Procedures  Labs Reviewed   CBC WITH DIFFERENTIAL - Abnormal; Notable for the following components:       Result Value    MCH 26.0 (*)     MCHC 31.1 (*)     Platelet Count 469 (*)     Neutrophils % 67.7 (*)     Lymphocytes % 16.7 (*)     Monocytes % 12.4 (*)     Monocytes, Absolute 1.29 (*)     All other components within normal limits   COMPREHENSIVE METABOLIC PANEL - Abnormal; Notable for the following components:    Creatinine 0.42 (*)     BUN/Creatinine Ratio 21 (*)     All other components within normal limits   LACTIC ACID, PLASMA - Normal    CULTURE, WOUND   CBC W/ AUTO DIFFERENTIAL    Narrative:     The following orders were created for panel order CBC auto differential.  Procedure                               Abnormality         Status                     ---------                               -----------         ------                     CBC with Differential[[id number]]       Abnormal            Final result                 Please view results for these tests on the individual orders.          Imaging Results              CT Hip Without Contrast Right (Final result)  Result time 11/08/23 15:10:51      Final result by Fady Fuentes DO (11/08/23 15:10:51)                   Impression:      Multiple enlarged lymph nodes located along the right common iliac and right-sided external iliac artery, the largest lymph node located along the right external iliac artery measuring up to 3.1 x 2.1 cm. Multiple enlarged right inguinal lymph nodes with the largest measuring up to 1.5 cm.  Ultrasound-guided biopsy recommended.    No acute fracture or dislocation.  No degenerative change of the hips.      Electronically signed by: Fady Fuentes  Date:    11/08/2023  Time:    15:10               Narrative:    EXAMINATION:  CT HIP WITHOUT CONTRAST RIGHT    CLINICAL HISTORY:  Hip pain, chronic, osteoarthritis suspected;    TECHNIQUE:  CT HIP WITHOUT CONTRAST RIGHT, multiple planes.    COMPARISON:  None    FINDINGS:  No acute fracture or dislocation.    No degenerative change of the hips.    Multiple enlarged lymph nodes located along the right common iliac and right-sided external iliac artery, the largest lymph node located along the right external iliac artery measuring up to 3.1 x 2.1 cm.  Multiple enlarged right inguinal lymph nodes with the largest measuring up to 1.5 cm.    No hematoma.                                       Medications   cefTRIAXone (ROCEPHIN) 1 g in dextrose 5 % in water (D5W) 100 mL IVPB (MB+) (has no administration in time  range)   morphine injection 1 mg (1 mg Intravenous Given 11/8/23 1534)   ondansetron injection 4 mg (4 mg Intravenous Given 11/8/23 1535)     Medical Decision Making  Patient is a 21 y/o AAM who presents to the Emergency Department with c/o right hip pain. Patient was seen at [facility] on 1/20/2023 after a car fell on his right hip while working, patient was diagnosed with sacrum fracture and discharged home. Patient has a follow up Ortho appointment at [facility] on 11/22/2023, however mother endorsed an increase in pain last night with foul smelling odor and discharge from wound on right hip.     Amount and/or Complexity of Data Reviewed  Labs: ordered.  Radiology: ordered.    Risk  Prescription drug management.               ED Course as of 11/08/23 1545   Wed Nov 08, 2023   1520 Lab and CT  results reviewed by me and discussed with patient has well as mother.Discharge instructions given along with strict return precautions, patient verbalizes understanding.   [AC]      ED Course User Index  [AC] Lopez Coleman FNP                    Clinical Impression:   Final diagnoses:  [M25.551] Pain of right hip (Primary)  [S32.10XS, S32.2XXS] Closed fracture of sacrum and coccyx, sequela  [L02.415] Abscess of right hip        ED Disposition Condition    Discharge Stable          ED Prescriptions       Medication Sig Dispense Start Date End Date Auth. Provider    cephALEXin (KEFLEX) 250 MG capsule Take 2 capsules (500 mg total) by mouth every 12 (twelve) hours. for 10 days 28 capsule 11/8/2023 11/18/2023 Lopez Coleman FNP    mupirocin (BACTROBAN) 2 % ointment Apply topically 3 (three) times daily. 1 g 11/8/2023 -- Lopez Coleman FNP    ibuprofen (ADVIL,MOTRIN) 600 MG tablet Take 1 tablet (600 mg total) by mouth every 6 (six) hours as needed for Pain. 20 tablet 11/8/2023 -- Lopez Coleman FNP    traMADoL (ULTRAM) 50 mg tablet Take 1 tablet (50 mg total) by mouth every 6 (six) hours as needed for Pain. 12  tablet 11/8/2023 -- Lopez Coleman FNP          Follow-up Information       Follow up With Specialties Details Why Contact Info    Lester Whitley, DO Family Medicine  As needed, If symptoms worsen [phone]  [facility] [place] [address]  [phone]               Lopez Coleman FNP  11/08/23 1541       Lopez Coleman FNP  11/08/23 1545

## 2023-11-08 NOTE — ED TRIAGE NOTES
PT ARRIVED TO ER WITH C/O (R) HIP PAIN. PT WITH KNOWN FRACTURE TO SACRUM AREA, BUT PT SAYS PAIN IS GETTING WORSE AND THAT HE HAS A WOUND OVER (R) HIP AREA THAT WILL NOT HEAL. HAS APPT WITH ORTHO ON NOV 22ND.

## 2023-11-08 NOTE — DISCHARGE INSTRUCTIONS
Take and apply medication as directed by the label on the bottle, follow up with [facility] if symptom are not improving by Friday for ultrasound of right hip and groin area.     The examination and treatment you have received in the Emergency Department today have been rendered on an emergency basis only and are not intended to be a substitute for an effort to provide complete medical care. You should contact your follow-up physician as it is important that you let him or her check you and report any new or remaining problems since it is impossible to recognize and treat all elements of an injury or illness in a single emergency care center visit.

## 2023-11-10 LAB — MICROORGANISM SPEC CULT: ABNORMAL

## 2023-11-12 ENCOUNTER — TELEPHONE (OUTPATIENT)
Dept: EMERGENCY MEDICINE | Facility: HOSPITAL | Age: 23
End: 2023-11-12
Payer: COMMERCIAL

## 2023-11-12 NOTE — TELEPHONE ENCOUNTER
spoke with mother. medications changed to cipro. Medications called to [facility] per mother request.

## 2023-11-13 ENCOUNTER — HOSPITAL ENCOUNTER (EMERGENCY)
Facility: HOSPITAL | Age: 23
Discharge: HOME | End: 2023-11-13
Payer: COMMERCIAL

## 2023-11-13 ENCOUNTER — TELEPHONE (OUTPATIENT)
Dept: EMERGENCY MEDICINE | Facility: HOSPITAL | Age: 23
End: 2023-11-13
Payer: COMMERCIAL

## 2023-11-13 VITALS
DIASTOLIC BLOOD PRESSURE: 70 MMHG | HEART RATE: 103 BPM | HEIGHT: 72 IN | OXYGEN SATURATION: 100 % | RESPIRATION RATE: 18 BRPM | BODY MASS INDEX: 35.23 KG/M2 | SYSTOLIC BLOOD PRESSURE: 116 MMHG | TEMPERATURE: 99 F | WEIGHT: 260.13 LBS

## 2023-11-13 DIAGNOSIS — T14.8XXA WOUND INFECTION: Primary | ICD-10-CM

## 2023-11-13 DIAGNOSIS — L08.9 WOUND INFECTION: Primary | ICD-10-CM

## 2023-11-13 LAB
ALBUMIN SERPL BCP-MCNC: 4.1 G/DL (ref 3.5–5)
ALBUMIN/GLOB SERPL: 0.9 {RATIO}
ALP SERPL-CCNC: 102 U/L (ref 45–115)
ALT SERPL W P-5'-P-CCNC: 28 U/L (ref 16–61)
ANION GAP SERPL CALCULATED.3IONS-SCNC: 12 MMOL/L (ref 7–16)
AST SERPL W P-5'-P-CCNC: 19 U/L (ref 15–37)
BASOPHILS # BLD AUTO: 0.03 K/UL (ref 0–0.2)
BASOPHILS NFR BLD AUTO: 0.3 % (ref 0–1)
BILIRUB SERPL-MCNC: 0.6 MG/DL (ref ?–1.2)
BUN SERPL-MCNC: 7 MG/DL (ref 7–18)
BUN/CREAT SERPL: 12 (ref 6–20)
CALCIUM SERPL-MCNC: 8.9 MG/DL (ref 8.5–10.1)
CHLORIDE SERPL-SCNC: 100 MMOL/L (ref 98–107)
CO2 SERPL-SCNC: 31 MMOL/L (ref 21–32)
CREAT SERPL-MCNC: 0.58 MG/DL (ref 0.7–1.3)
DIFFERENTIAL METHOD BLD: ABNORMAL
EGFR (NO RACE VARIABLE) (RUSH/TITUS): 142 ML/MIN/1.73M2
EOSINOPHIL # BLD AUTO: 0.35 K/UL (ref 0–0.5)
EOSINOPHIL NFR BLD AUTO: 2.9 % (ref 1–4)
ERYTHROCYTE [DISTWIDTH] IN BLOOD BY AUTOMATED COUNT: 13.7 % (ref 11.5–14.5)
GLOBULIN SER-MCNC: 4.7 G/DL (ref 2–4)
GLUCOSE SERPL-MCNC: 87 MG/DL (ref 74–106)
HCT VFR BLD AUTO: 49.3 % (ref 40–54)
HGB BLD-MCNC: 15.3 G/DL (ref 13.5–18)
LYMPHOCYTES # BLD AUTO: 1.43 K/UL (ref 1–4.8)
LYMPHOCYTES NFR BLD AUTO: 11.9 % (ref 27–41)
MCH RBC QN AUTO: 25.9 PG (ref 27–31)
MCHC RBC AUTO-ENTMCNC: 31 G/DL (ref 32–36)
MCV RBC AUTO: 83.4 FL (ref 80–96)
MONOCYTES # BLD AUTO: 1.16 K/UL (ref 0–0.8)
MONOCYTES NFR BLD AUTO: 9.7 % (ref 2–6)
MPC BLD CALC-MCNC: 9.9 FL (ref 9.4–12.4)
NEUTROPHILS # BLD AUTO: 9 K/UL (ref 1.8–7.7)
NEUTROPHILS NFR BLD AUTO: 75.2 % (ref 53–65)
PLATELET # BLD AUTO: 445 K/UL (ref 150–400)
POTASSIUM SERPL-SCNC: 3.7 MMOL/L (ref 3.5–5.1)
PROT SERPL-MCNC: 8.8 G/DL (ref 6.4–8.2)
RBC # BLD AUTO: 5.91 M/UL (ref 4.6–6.2)
SODIUM SERPL-SCNC: 139 MMOL/L (ref 136–145)
WBC # BLD AUTO: 11.97 K/UL (ref 4.5–11)

## 2023-11-13 PROCEDURE — 85025 COMPLETE CBC W/AUTO DIFF WBC: CPT | Performed by: NURSE PRACTITIONER

## 2023-11-13 PROCEDURE — 99284 PR EMERGENCY DEPT VISIT,LEVEL IV: ICD-10-PCS | Mod: ,,, | Performed by: NURSE PRACTITIONER

## 2023-11-13 PROCEDURE — 99284 EMERGENCY DEPT VISIT MOD MDM: CPT

## 2023-11-13 PROCEDURE — 99284 EMERGENCY DEPT VISIT MOD MDM: CPT | Mod: ,,, | Performed by: NURSE PRACTITIONER

## 2023-11-13 PROCEDURE — 80053 COMPREHEN METABOLIC PANEL: CPT | Performed by: NURSE PRACTITIONER

## 2023-11-13 RX ORDER — CIPROFLOXACIN 500 MG/1
500 TABLET ORAL 2 TIMES DAILY
COMMUNITY
Start: 2023-11-11 | End: 2023-12-04

## 2023-11-13 RX ORDER — SULFAMETHOXAZOLE AND TRIMETHOPRIM 800; 160 MG/1; MG/1
1 TABLET ORAL 2 TIMES DAILY
Qty: 20 TABLET | Refills: 0 | Status: SHIPPED | OUTPATIENT
Start: 2023-11-13 | End: 2023-11-13 | Stop reason: ALTCHOICE

## 2023-11-13 NOTE — PROGRESS NOTES
Wound culture grew out MRSA. Discontinue Keflex. Start Bactrim DS one tab by mouth twice a day. I sent medication into the pharmacy. Follow up with PCP this week.

## 2023-11-13 NOTE — TELEPHONE ENCOUNTER
PT AWARE OF NEW SCRIPT AND TO STOP KEFLEX.   ----- Message from SHAHEED Cancino sent at 11/13/2023  9:05 AM CST -----  Wound culture grew out MRSA. Discontinue Keflex. Start Bactrim DS one tab by mouth twice a day. I sent medication into the pharmacy. Follow up with PCP this week.

## 2023-11-13 NOTE — ED PROVIDER NOTES
Encounter Date: 11/13/2023       History     Chief Complaint   Patient presents with    Wound Infection     21 y/o male with PMHx of Raynaud's Syndrome and Vitiligoarrived to the ED with his mother per POV for follow up of infected wound on right iliac of pelvis related to crush injury. Patient was seen at [facility] on 10/20/2023 after a car fell on his right hip while working, patient was diagnosed with sacrum fracture and discharged home.  Patient was seen here at [facility] ED for infected wound.Had wound culture and was placed on Keflex. Wound culture grew out MRSA and patient was changed to Cipro po on 11/11. Reports decrease in drainage since starting on Cipro. Denies fever.  Patient has follow up with ortho at [facility] on 11/22 with CAROLINA Mercedes III, NP.    The history is provided by the patient and a parent.     Review of patient's allergies indicates:   Allergen Reactions    Acetomenaphthone Other (See Comments)     Elevated liver enzymes when in childhood     Past Medical History:   Diagnosis Date    Raynaud's syndrome without gangrene 03/22/2023    Vitiligo 03/22/2023     Past Surgical History:   Procedure Laterality Date    Knee Scope Left 2017     Family History   Problem Relation Age of Onset    No Known Problems Mother     Diabetes Father     Lupus Paternal Cousin      Social History     Tobacco Use    Smoking status: Never     Passive exposure: Never    Smokeless tobacco: Never   Substance Use Topics    Alcohol use: Not Currently    Drug use: Not Currently     Review of Systems   Constitutional:  Negative for activity change, appetite change, diaphoresis and fever.   HENT: Negative.     Eyes: Negative.    Respiratory: Negative.     Cardiovascular: Negative.    Gastrointestinal: Negative.    Genitourinary: Negative.    Musculoskeletal:  Positive for gait problem (ambulates with crutches).   Skin:  Positive for wound (right pelvis wound).   Allergic/Immunologic: Negative.    Hematological:  Negative.    Psychiatric/Behavioral: Negative.         Physical Exam     Initial Vitals [11/13/23 1135]   BP Pulse Resp Temp SpO2   116/70 103 18 98.5 °F (36.9 °C) 100 %      MAP       --         Physical Exam    Nursing note and vitals reviewed.  Constitutional: Vital signs are normal. He appears well-developed and well-nourished. He is cooperative. He does not have a sickly appearance. He does not appear ill. No distress.   HENT:   Head: Normocephalic and atraumatic.   Right Ear: External ear normal.   Left Ear: External ear normal.   Nose: Nose normal.   Mouth/Throat: Mucous membranes are normal.   Eyes: Lids are normal. Pupils are equal, round, and reactive to light.   Neck: Neck supple.   Normal range of motion.  Cardiovascular:  Normal rate, regular rhythm, normal heart sounds, intact distal pulses and normal pulses.           No edema to BLE   Pulmonary/Chest: Effort normal and breath sounds normal.   Abdominal: Abdomen is soft. Bowel sounds are normal. There is no abdominal tenderness.   Musculoskeletal:         General: Normal range of motion.      Cervical back: Normal range of motion and neck supple.     Lymphadenopathy:     He has no cervical adenopathy.   Neurological: He is alert and oriented to person, place, and time. He has normal strength. No sensory deficit.   Skin: Skin is warm, dry and intact. Capillary refill takes less than 2 seconds. No rash noted. No cyanosis. Nails show no clubbing.   Psychiatric: He has a normal mood and affect. His speech is normal and behavior is normal. Thought content normal. Cognition and memory are normal.       Medical Screening Exam   See Full Note    ED Course   Procedures  Labs Reviewed   COMPREHENSIVE METABOLIC PANEL - Abnormal; Notable for the following components:       Result Value    Creatinine 0.58 (*)     Total Protein 8.8 (*)     Globulin 4.7 (*)     All other components within normal limits   CBC WITH DIFFERENTIAL - Abnormal; Notable for the following  components:    WBC 11.97 (*)     MCH 25.9 (*)     MCHC 31.0 (*)     Platelet Count 445 (*)     Neutrophils % 75.2 (*)     Lymphocytes % 11.9 (*)     Neutrophils, Abs 9.00 (*)     Monocytes % 9.7 (*)     Monocytes, Absolute 1.16 (*)     All other components within normal limits   CBC W/ AUTO DIFFERENTIAL    Narrative:     The following orders were created for panel order CBC auto differential.  Procedure                               Abnormality         Status                     ---------                               -----------         ------                     CBC with Differential[[id number]]       Abnormal            Final result                 Please view results for these tests on the individual orders.          Imaging Results              US Extremity Non Vascular Limited Right (Final result)  Result time 11/13/23 12:59:20   Procedure changed from US Pelvis Complete Non OB     Final result by Fady Fuentes DO (11/13/23 12:59:20)                   Impression:      Avascular complex area within the right lateral hip measures up to 1.6 cm could represent phlegmon or mildly enlarged lymph node.      Electronically signed by: Fady Fuentes  Date:    11/13/2023  Time:    12:59               Narrative:    EXAMINATION:  US EXTREMITY NON VASCULAR LIMITED RIGHT    CLINICAL HISTORY:  righ side pelvis wound;    TECHNIQUE:  US EXTREMITY NON VASCULAR LIMITED RIGHT    COMPARISON:  11/8/23    FINDINGS:  Avascular complex area within the right lateral hip measures up to 1.6 cm could represent phlegmon or mildly enlarged lymph node.                                       Medications - No data to display  Medical Decision Making  VS wnl. Afebrile.  Wound to right pelvis with scant amount of yellow drainage noted on dressing. No redness or active drainage noted. Limited ROM of right hip due to pain. Appears in NAD.    Amount and/or Complexity of Data Reviewed  Labs: ordered.     Details: Labs Reviewed  COMPREHENSIVE  METABOLIC PANEL - Abnormal; Notable for the following components:     Creatinine                    0.58 (*)               Total Protein                 8.8 (*)                Globulin                      4.7 (*)             All other components within normal limits  CBC WITH DIFFERENTIAL - Abnormal; Notable for the following components:     WBC                           11.97 (*)               MCH                           25.9 (*)               MCHC                          31.0 (*)               Platelet Count                445 (*)                Neutrophils %                 75.2 (*)               Lymphocytes %                 11.9 (*)               Neutrophils, Abs              9.00 (*)               Monocytes %                   9.7 (*)                Monocytes, Absolute           1.16 (*)            All other components within normal limits  CBC W/ AUTO DIFFERENTIAL   Radiology: ordered.     Details: Ultrasound of right iliac crest: Avascular complex area within the right lateral hip measures up to 1.6 cm could represent phlegmon or mildly enlarged lymph node.  Discussion of management or test interpretation with external provider(s): -Unable to reach ALTHEA Mercedes by phone. Chat through epic with ALTHEA Mercedes at [facility] ortho per patient's phone to see if patient needs to be seen sooner than 11/22 as scheduled. Stated that patient does not need to be seen any sooner. Requested disc copy for CT and ultrasound for patient to bring to his 11/22 appointment.   Discharge MDM  I discussed lab and ultrasound results with patient and his mother.  Reviewed discharge instructions with patient and his mother. Reviewed s/s to monitor for and when to return to the ED if needed. Complete Cipro as directed.    Patient was discharged in stable condition.  Detailed return precautions discussed. Patient and his mother agreed to treatment plan and verbalized understanding.                               Clinical Impression:    Final diagnoses:  [T14.8XXA, L08.9] Wound infection - right iliac crest (Primary)        ED Disposition Condition    Discharge Stable          ED Prescriptions    None       Follow-up Information       Follow up With Specialties Details Why Contact Glenna Mercedes III, NP  On 11/22/2023 as scheduled [facility]             Aurelia Winslow, JARONP  11/13/23 4570

## 2023-11-13 NOTE — ED TRIAGE NOTES
Presents for follow up of infected wound to right illac area of hip.  Tried to see PCP today and was told they were full.  Mother states no cars were in the parking lot.  Came for further evaluation and care of hip.

## 2023-11-24 DIAGNOSIS — M34.9 DIFFUSE SYSTEMIC SCLEROSIS: ICD-10-CM

## 2023-11-24 RX ORDER — MYCOPHENOLATE MOFETIL 500 MG/1
1500 TABLET ORAL 2 TIMES DAILY
Qty: 180 TABLET | Refills: 0 | Status: ACTIVE | OUTPATIENT
Start: 2023-11-24 | End: 2023-12-04

## 2023-12-01 DIAGNOSIS — I73.00 RAYNAUD'S SYNDROME WITHOUT GANGRENE: ICD-10-CM

## 2023-12-01 RX ORDER — AMLODIPINE BESYLATE 5 MG/1
5 TABLET ORAL DAILY
Qty: 30 TABLET | Refills: 5 | Status: SHIPPED | OUTPATIENT
Start: 2023-12-01 | End: 2024-02-04 | Stop reason: SDUPTHER

## 2023-12-03 ENCOUNTER — HOSPITAL ENCOUNTER (EMERGENCY)
Facility: HOSPITAL | Age: 23
Discharge: ANOTHER HOSPITAL | End: 2023-12-03
Payer: COMMERCIAL

## 2023-12-03 VITALS
BODY MASS INDEX: 35.21 KG/M2 | RESPIRATION RATE: 16 BRPM | TEMPERATURE: 98 F | HEART RATE: 79 BPM | DIASTOLIC BLOOD PRESSURE: 48 MMHG | HEIGHT: 72 IN | WEIGHT: 260 LBS | SYSTOLIC BLOOD PRESSURE: 117 MMHG | OXYGEN SATURATION: 94 %

## 2023-12-03 DIAGNOSIS — L02.211 ABDOMINAL WALL ABSCESS: Primary | ICD-10-CM

## 2023-12-03 LAB
ALBUMIN SERPL BCP-MCNC: 3.5 G/DL (ref 3.5–5)
ALBUMIN/GLOB SERPL: 0.8 {RATIO}
ALP SERPL-CCNC: 77 U/L (ref 45–115)
ALT SERPL W P-5'-P-CCNC: 28 U/L (ref 16–61)
ANION GAP SERPL CALCULATED.3IONS-SCNC: 13 MMOL/L (ref 7–16)
AST SERPL W P-5'-P-CCNC: 20 U/L (ref 15–37)
BASOPHILS # BLD AUTO: 0.04 K/UL (ref 0–0.2)
BASOPHILS NFR BLD AUTO: 0.4 % (ref 0–1)
BILIRUB SERPL-MCNC: 0.7 MG/DL (ref ?–1.2)
BUN SERPL-MCNC: 6 MG/DL (ref 7–18)
BUN/CREAT SERPL: 12 (ref 6–20)
CALCIUM SERPL-MCNC: 9.2 MG/DL (ref 8.5–10.1)
CHLORIDE SERPL-SCNC: 103 MMOL/L (ref 98–107)
CO2 SERPL-SCNC: 28 MMOL/L (ref 21–32)
CREAT SERPL-MCNC: 0.5 MG/DL (ref 0.7–1.3)
DIFFERENTIAL METHOD BLD: ABNORMAL
EGFR (NO RACE VARIABLE) (RUSH/TITUS): 149 ML/MIN/1.73M2
EOSINOPHIL # BLD AUTO: 0.14 K/UL (ref 0–0.5)
EOSINOPHIL NFR BLD AUTO: 1.3 % (ref 1–4)
ERYTHROCYTE [DISTWIDTH] IN BLOOD BY AUTOMATED COUNT: 13.9 % (ref 11.5–14.5)
ERYTHROCYTE [SEDIMENTATION RATE] IN BLOOD BY WESTERGREN METHOD: 20 MM/HR (ref 0–15)
GLOBULIN SER-MCNC: 4.5 G/DL (ref 2–4)
GLUCOSE SERPL-MCNC: 95 MG/DL (ref 74–106)
HCT VFR BLD AUTO: 44.6 % (ref 40–54)
HGB BLD-MCNC: 14 G/DL (ref 13.5–18)
LACTATE SERPL-SCNC: 0.6 MMOL/L (ref 0.4–2)
LYMPHOCYTES # BLD AUTO: 1.36 K/UL (ref 1–4.8)
LYMPHOCYTES NFR BLD AUTO: 13.1 % (ref 27–41)
MCH RBC QN AUTO: 26 PG (ref 27–31)
MCHC RBC AUTO-ENTMCNC: 31.4 G/DL (ref 32–36)
MCV RBC AUTO: 82.7 FL (ref 80–96)
MONOCYTES # BLD AUTO: 1.18 K/UL (ref 0–0.8)
MONOCYTES NFR BLD AUTO: 11.3 % (ref 2–6)
MPC BLD CALC-MCNC: 9.7 FL (ref 9.4–12.4)
NEUTROPHILS # BLD AUTO: 7.69 K/UL (ref 1.8–7.7)
NEUTROPHILS NFR BLD AUTO: 73.9 % (ref 53–65)
PLATELET # BLD AUTO: 393 K/UL (ref 150–400)
POTASSIUM SERPL-SCNC: 3.6 MMOL/L (ref 3.5–5.1)
PROT SERPL-MCNC: 8 G/DL (ref 6.4–8.2)
RBC # BLD AUTO: 5.39 M/UL (ref 4.6–6.2)
SODIUM SERPL-SCNC: 140 MMOL/L (ref 136–145)
WBC # BLD AUTO: 10.41 K/UL (ref 4.5–11)

## 2023-12-03 PROCEDURE — 99284 EMERGENCY DEPT VISIT MOD MDM: CPT | Mod: ,,, | Performed by: REGISTERED NURSE

## 2023-12-03 PROCEDURE — 99285 EMERGENCY DEPT VISIT HI MDM: CPT | Mod: 25

## 2023-12-03 PROCEDURE — 25500020 PHARM REV CODE 255: Performed by: NURSE PRACTITIONER

## 2023-12-03 PROCEDURE — 80053 COMPREHEN METABOLIC PANEL: CPT | Performed by: NURSE PRACTITIONER

## 2023-12-03 PROCEDURE — 99284 PR EMERGENCY DEPT VISIT,LEVEL IV: ICD-10-PCS | Mod: ,,, | Performed by: REGISTERED NURSE

## 2023-12-03 PROCEDURE — 83605 ASSAY OF LACTIC ACID: CPT | Performed by: NURSE PRACTITIONER

## 2023-12-03 PROCEDURE — 85025 COMPLETE CBC W/AUTO DIFF WBC: CPT | Performed by: NURSE PRACTITIONER

## 2023-12-03 PROCEDURE — 86140 C-REACTIVE PROTEIN: CPT | Performed by: NURSE PRACTITIONER

## 2023-12-03 PROCEDURE — 85651 RBC SED RATE NONAUTOMATED: CPT | Performed by: NURSE PRACTITIONER

## 2023-12-03 RX ADMIN — IOPAMIDOL 100 ML: 755 INJECTION, SOLUTION INTRAVENOUS at 05:12

## 2023-12-03 NOTE — ED PROVIDER NOTES
Encounter Date: 12/3/2023       History     Chief Complaint   Patient presents with    Flank Pain     23 y/o AAM arrived to the ED with complaint of right groin/lower abdomen pain that has progressively become worse since Tuesday. Rates pain 7/10. Worse with movement.  Reports wound is healed, but has swelling, skin firm, red and extremely tender. Patient was seen at [facility] on Friday by  CAROLINA Mercedes III, NP and diagnosed with seroma. He was started on Clindamycin 300 mg TID and instructed to go to the ED if symptoms became worse.    The history is provided by the patient.     Review of patient's allergies indicates:   Allergen Reactions    Acetomenaphthone Other (See Comments)     Elevated liver enzymes when in childhood     Past Medical History:   Diagnosis Date    Raynaud's syndrome without gangrene 03/22/2023    Vitiligo 03/22/2023     Past Surgical History:   Procedure Laterality Date    Knee Scope Left 2017     Family History   Problem Relation Age of Onset    No Known Problems Mother     Diabetes Father     Lupus Paternal Cousin      Social History     Tobacco Use    Smoking status: Never     Passive exposure: Never    Smokeless tobacco: Never   Substance Use Topics    Alcohol use: Not Currently    Drug use: Not Currently     Review of Systems   Constitutional:  Positive for activity change (decreased). Negative for appetite change, fatigue and fever.   HENT: Negative.     Eyes: Negative.  Negative for photophobia, pain and visual disturbance.   Respiratory: Negative.  Negative for cough and shortness of breath.    Cardiovascular: Negative.  Negative for chest pain, palpitations and leg swelling.   Gastrointestinal:  Positive for abdominal pain (right groin and lower abdomen). Negative for constipation, diarrhea, nausea and vomiting.   Genitourinary: Negative.  Negative for dysuria, flank pain and frequency.   Musculoskeletal:  Negative for arthralgias, gait problem, myalgias, neck pain and neck stiffness.    Skin: Negative.    Neurological:  Negative for dizziness, weakness, light-headedness and headaches.   Hematological: Negative.    Psychiatric/Behavioral: Negative.         Physical Exam     Initial Vitals [12/03/23 1543]   BP Pulse Resp Temp SpO2   111/64 88 18 98.2 °F (36.8 °C) (!) 94 %      MAP       --         Physical Exam    Nursing note and vitals reviewed.  Constitutional: Vital signs are normal. He appears well-developed and well-nourished. He is cooperative.  Non-toxic appearance. He does not have a sickly appearance. He does not appear ill.   HENT:   Head: Normocephalic and atraumatic.   Right Ear: External ear normal.   Left Ear: External ear normal.   Mouth/Throat: Mucous membranes are normal.   Eyes: Pupils are equal, round, and reactive to light.   Cardiovascular:  Normal rate, regular rhythm, normal heart sounds, intact distal pulses and normal pulses.           Pulmonary/Chest: Effort normal and breath sounds normal.   Abdominal: Abdomen is soft. Bowel sounds are normal. There is abdominal tenderness in the right lower quadrant.   Right groin and lower quad wound healing, but noted to have erythremia, indurated area and exquisitely tender with palpation.      Lymphadenopathy:     He has no cervical adenopathy.   Neurological: He is alert and oriented to person, place, and time. No sensory deficit.   Skin: Skin is warm, dry and intact. Capillary refill takes less than 2 seconds.   Psychiatric: He has a normal mood and affect. His speech is normal and behavior is normal.         Medical Screening Exam   See Full Note    ED Course   Procedures  Labs Reviewed   COMPREHENSIVE METABOLIC PANEL - Abnormal; Notable for the following components:       Result Value    BUN 6 (*)     Creatinine 0.50 (*)     Globulin 4.5 (*)     All other components within normal limits   SEDIMENTATION RATE, AUTOMATED - Abnormal; Notable for the following components:    ESR Westergren 20 (*)     All other components within  normal limits   CBC WITH DIFFERENTIAL - Abnormal; Notable for the following components:    MCH 26.0 (*)     MCHC 31.4 (*)     Neutrophils % 73.9 (*)     Lymphocytes % 13.1 (*)     Monocytes % 11.3 (*)     Monocytes, Absolute 1.18 (*)     All other components within normal limits   LACTIC ACID, PLASMA - Normal   CBC W/ AUTO DIFFERENTIAL    Narrative:     The following orders were created for panel order CBC auto differential.  Procedure                               Abnormality         Status                     ---------                               -----------         ------                     CBC with Differential[[id number]]       Abnormal            Final result                 Please view results for these tests on the individual orders.   C-REACTIVE PROTEIN          Imaging Results              CT Abdomen Pelvis With IV Contrast NO Oral Contrast (Final result)  Result time 12/03/23 18:29:47      Final result by Librado Nelson DO (12/03/23 18:29:47)                   Impression:      There is a rim enhancing fluid collection within the anterior/lateral right lower abdominal wall/proximal thigh at the iliac bone level which measures up to 6.2 x 1.9 cm in width and AP dimensions respectively and up to 7.5 cm in craniocaudad dimensions. This is suspicious for abscess or other postoperative fluid collection. This extends to the ventral cortex of the iliac bone and approaches the skin surface.  Redemonstration of heterotopic calcification along the medial aspect of the ventral iliac bone. Redemonstration of prominent right inguinal and right external iliac lymph nodes.  Representative right external iliac/common femoral lymph node measures up to 1.9 cm in short axis dimension.  These may be reactive. Malignancy/lymphoma not excluded    The CT exam was performed using one or more of the following dose    reduction techniques- Automated exposure control, adjustment of the mA    and/or kV according to patient size,  and/or use of iterative    reconstructed technique.    Point of Service: [facility]      Electronically signed by: Librado Nelson  Date:    12/03/2023  Time:    18:29               Narrative:    EXAMINATION:  CT ABDOMEN PELVIS WITH IV CONTRAST    CLINICAL HISTORY:  Abdominal abscess/infection suspected;    COMPARISON:  Right hip CT November 8, 2023    TECHNIQUE:  Multiple axial tomographic images of the abdomen and pelvis were obtained after administration of 100 cc Isovue 370 intravenous contrast.    FINDINGS:  There is a rim enhancing fluid collection within the anterior/lateral right lower abdominal wall/proximal thigh at the iliac bone level which measures up to 6.2 x 1.9 cm in width and AP dimensions respectively and up to 7.5 cm in craniocaudad dimensions.  This is suspicious for abscess or other postoperative fluid collection.  This extends to the ventral cortex of the iliac bone and approaches the skin surface.  Redemonstration of heterotopic calcification along the medial aspect of the ventral iliac bone.  Redemonstration of prominent right inguinal and right external iliac lymph nodes.  Representative right external iliac/common femoral lymph node measures up to 1.9 cm in short axis dimension.  These may be reactive.  Malignancy/lymphoma not excluded    Mild dependent changes of the lungs noted.  No worrisome focal hepatic abnormality demonstrated on submitted images.  Visualized gallbladder grossly unremarkable.  Visualized pancreas appears unremarkable.  Spleen grossly unremarkable.  Bilateral adrenal glands grossly unremarkable.  No evidence of hydronephrosis.  Urinary bladder incompletely distended.  Prostate and seminal vesicles grossly unremarkable.  No convincing evidence of gastrointestinal obstruction or acute appendicitis.  Vasculature grossly unremarkable.  Osseous structures demonstrate no acute abnormality.                                       Medications   iopamidoL (ISOVUE-370)  injection 100 mL (100 mLs Intravenous Given 12/3/23 9240)     Medical Decision Making  VS wnl. Right lower quad of abdomen and groin noted with healing wound, skin intact with no drainage. Skin is red, indurated and very tender with light palpation. Painful with movement. CT scan was denied while in clinic on Friday due to patient was afebrile. He was placed on Clindamycin. Pain, swelling and redness has progressively become worse.      -17:26: Dr. Mirza [facility] approved CT abdomen/pelvis with contrast.  -1929:  Pt accepted for transfer to [facility] ED per Dr. Valentino for abdominal wall abscess. No IV antibiotics ordered.  Will wait until further evaluation at [facility].    Amount and/or Complexity of Data Reviewed  Labs: ordered.     Details: Labs Reviewed  COMPREHENSIVE METABOLIC PANEL - Abnormal; Notable for the following components:     BUN                           6 (*)                  Creatinine                    0.50 (*)               Globulin                      4.5 (*)             All other components within normal limits  CBC WITH DIFFERENTIAL - Abnormal; Notable for the following components:     MCH                           26.0 (*)               MCHC                          31.4 (*)               Neutrophils %                 73.9 (*)               Lymphocytes %                 13.1 (*)               Monocytes %                   11.3 (*)               Monocytes, Absolute           1.18 (*)            All other components within normal limits  LACTIC ACID, PLASMA - Normal  CBC W/ AUTO DIFFERENTIAL         Narrative: The following orders were created for panel order CBC auto differential.                  Procedure                               Abnormality         Status                                     ---------                               -----------         ------                                     CBC with Differential[[id number]]       Abnormal            Final result                                                  Please view results for these tests on the individual orders.  C-REACTIVE PROTEIN  SEDIMENTATION RATE, AUTOMATED   Radiology: ordered.     Details: CT Abdomen Pelvis With IV Contrast NO Oral Contrast   Final Result        There is a rim enhancing fluid collection within the anterior/lateral right lower abdominal wall/proximal thigh at the iliac bone level which measures up to 6.2 x 1.9 cm in width and AP dimensions respectively and up to 7.5 cm in craniocaudad dimensions. This is suspicious for abscess or other postoperative fluid collection. This extends to the ventral cortex of the iliac bone and approaches the skin surface.  Redemonstration of heterotopic calcification along the medial aspect of the ventral iliac bone. Redemonstration of prominent right inguinal and right external iliac lymph nodes.  Representative right external iliac/common femoral lymph node measures up to 1.9 cm in short axis dimension.  These may be reactive. Malignancy/lymphoma not excluded        The CT exam was performed using one or more of the following dose        reduction techniques- Automated exposure control, adjustment of the mA        and/or kV according to patient size, and/or use of iterative        reconstructed technique.        Point of Service: [facility]            Electronically signed by: Librado Nelson    Date:    12/03/2023    Time:    18:29       Discussion of management or test interpretation with external provider(s): -1849:  On the phone with Dr. Valentino regarding case, lab results, and radiology results.  Emergent consult needed.  Dr. Valentino will call back.  -1919:  Discussed with Dr. Valentino the above.  Pt is an established pt of [facility].  He did go seek medical attention on 12-1-23, but states insurance denied request for CT.  He was instructed if symptoms worsened to seek treatment at ED.  This is what prompted his visit to [facility] ED. Dr. Valentino states pt may be transferred to [facility] ED with  himself as accepting.    Risk  Prescription drug management.                                      Clinical Impression:   Final diagnoses:  [L02.211] Abdominal wall abscess (Primary)        ED Disposition Condition    Transfer to Another Facility Stable                Naman, Anisa, NP-C  12/03/23 1935       Anisa Florence NP-C  12/03/23 1937       Anisa Florence NP-C  12/03/23 2131

## 2023-12-03 NOTE — ED TRIAGE NOTES
Received ambulatory, was seen at [facility] on Friday for pain in right side. Was told he may have a fluid collection in hip area, and to report to an ER  in pain worsened. Painful area with mild edema, redness and slight heat noted

## 2023-12-04 ENCOUNTER — OFFICE VISIT (OUTPATIENT)
Dept: RHEUMATOLOGY | Facility: CLINIC | Age: 23
End: 2023-12-04
Payer: COMMERCIAL

## 2023-12-04 DIAGNOSIS — M34.9 DIFFUSE SYSTEMIC SCLEROSIS: Primary | ICD-10-CM

## 2023-12-04 DIAGNOSIS — I73.01 RAYNAUD'S PHENOMENON WITH GANGRENE: ICD-10-CM

## 2023-12-04 DIAGNOSIS — Z79.899 HIGH RISK MEDICATION USE: ICD-10-CM

## 2023-12-04 PROBLEM — T14.90XA TRAUMA: Status: ACTIVE | Noted: 2023-10-21
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
## 2023-12-04 PROCEDURE — 1160F PR REVIEW ALL MEDS BY PRESCRIBER/CLIN PHARMACIST DOCUMENTED: ICD-10-PCS | Mod: CPTII,95,, | Performed by: INTERNAL MEDICINE

## 2023-12-04 PROCEDURE — 99214 PR OFFICE/OUTPT VISIT, EST, LEVL IV, 30-39 MIN: ICD-10-PCS | Mod: 95,,, | Performed by: INTERNAL MEDICINE

## 2023-12-04 PROCEDURE — 1160F RVW MEDS BY RX/DR IN RCRD: CPT | Mod: CPTII,95,, | Performed by: INTERNAL MEDICINE

## 2023-12-04 PROCEDURE — 99214 OFFICE O/P EST MOD 30 MIN: CPT | Mod: 95,,, | Performed by: INTERNAL MEDICINE

## 2023-12-04 PROCEDURE — 1159F PR MEDICATION LIST DOCUMENTED IN MEDICAL RECORD: ICD-10-PCS | Mod: CPTII,95,, | Performed by: INTERNAL MEDICINE

## 2023-12-04 PROCEDURE — 1159F MED LIST DOCD IN RCRD: CPT | Mod: CPTII,95,, | Performed by: INTERNAL MEDICINE

## 2023-12-04 RX ORDER — CLINDAMYCIN HYDROCHLORIDE 300 MG/1
300 CAPSULE ORAL EVERY 8 HOURS
COMMUNITY
Start: 2023-12-01 | End: 2023-12-11

## 2023-12-04 ASSESSMENT — ROUTINE ASSESSMENT OF PATIENT INDEX DATA (RAPID3)
FATIGUE SCORE: 3
MDHAQ FUNCTION SCORE: 1
TOTAL RAPID3 SCORE: 3.78
PATIENT GLOBAL ASSESSMENT SCORE: 5
PSYCHOLOGICAL DISTRESS SCORE: 1.1
PAIN SCORE: 3
AM STIFFNESS SCORE: 1, YES

## 2023-12-04 NOTE — PROGRESS NOTES
12/4/2023     9:27 AM   Rapid3 Question Responses and Scores   MDHAQ Score 1   Psychologic Score 1.1   Pain Score 3   When you awakened in the morning OVER THE LAST WEEK, did you feel stiff? Yes   Fatigue Score 3   Global Health Score 5   RAPID3 Score 3.78        Answers submitted by the patient for this visit:  Rheumatology Questionnaire (Submitted on 12/4/2023)  fever: No  eye redness: No  mouth sores: No  headaches: No  shortness of breath: No  chest pain: No  trouble swallowing: No  diarrhea: No  constipation: No  unexpected weight change: No  genital sore: No  During the last 3 days, have you had a skin rash?: Yes  Bruises or bleeds easily: No  cough: No
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
## 2023-12-04 NOTE — ASSESSMENT & PLAN NOTE
He reports no new lesions on amlodipine and ASA 81 mg/d but notes that he was more symptomatic when off of mycophenolate mofetil which is not an expected observation; impact of MMF is expected to be chronic reduction of disease progression
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
## 2023-12-04 NOTE — ASSESSMENT & PLAN NOTE
On MMF for systemic scleroderma but will need to hold this while soft tissue lesion is evaluated to determine if an abscess that will require drainage and prolonged antibiotics.     I would not expect rapid progression of scleroderma off of mycophenolate mofetil    If he has an abscess that requires a longer course of antibiotics then we will discuss when and how to resume mycophenolate mofetil once additional information available

## 2023-12-07 LAB — CRP SERPL-MCNC: 4.11 MG/DL (ref 0–0.8)

## 2024-01-03 ENCOUNTER — PATIENT MESSAGE (OUTPATIENT)
Dept: RHEUMATOLOGY | Facility: CLINIC | Age: 24
End: 2024-01-03

## 2024-02-04 DIAGNOSIS — K21.9 GASTROESOPHAGEAL REFLUX DISEASE WITHOUT ESOPHAGITIS: ICD-10-CM

## 2024-02-04 DIAGNOSIS — K22.4 ESOPHAGEAL DYSMOTILITY DUE TO SYSTEMIC DISEASE: ICD-10-CM

## 2024-02-04 DIAGNOSIS — I73.00 RAYNAUD'S SYNDROME WITHOUT GANGRENE: ICD-10-CM

## 2024-02-05 RX ORDER — PANTOPRAZOLE SODIUM 40 MG/1
40 TABLET, DELAYED RELEASE ORAL DAILY
Qty: 30 TABLET | Refills: 11 | Status: SHIPPED | OUTPATIENT
Start: 2024-02-05 | End: 2024-04-09

## 2024-02-05 RX ORDER — AMLODIPINE BESYLATE 5 MG/1
5 TABLET ORAL DAILY
Qty: 30 TABLET | Refills: 5 | Status: SHIPPED | OUTPATIENT
Start: 2024-02-05 | End: 2025-02-04

## 2024-03-29 ENCOUNTER — PATIENT MESSAGE (OUTPATIENT)
Dept: RHEUMATOLOGY | Facility: CLINIC | Age: 24
End: 2024-03-29

## 2024-03-29 DIAGNOSIS — M34.9 DIFFUSE SYSTEMIC SCLEROSIS: Primary | ICD-10-CM

## 2024-04-02 NOTE — TELEPHONE ENCOUNTER
Good morning Dr Koch,   I have only seen Rony once and that was a year ago as a walk in for URI. I am not sure why I was listed as PCP. I will be happy to make this referral for him but he probably needs to come back in the clinic if I need to make referral to GI. Just let me know. Thanks

## 2024-04-04 NOTE — PROGRESS NOTES
Subjective:       Patient ID: Rony Silva is a 23 y.o. male Body mass index is 36.39 kg/m².    Chief Complaint: Abdominal Pain    This patient is new to me.  Referring Provider: Dr. Mickey Koch for epigastric pain.     Has scleraderma - had bad heartburn. Has been on protonix 40 mg QD since 5-6/2023.    No EGD/Colonoscopy    Had diarrhea - has since resolved.     Gastroesophageal Reflux  He complains of abdominal pain, early satiety, globus sensation, heartburn, nausea and water brash. He reports no chest pain, no coughing or no sore throat. This is a chronic problem. The current episode started more than 1 month ago. The problem occurs frequently. The problem has been waxing and waning. Pertinent negatives include no fatigue.     Review of Systems   Constitutional:  Negative for activity change, appetite change, fatigue, fever and unexpected weight change.   HENT:  Positive for trouble swallowing (r/t scleraderma). Negative for sore throat.    Respiratory:  Negative for cough and shortness of breath.    Cardiovascular:  Negative for chest pain.   Gastrointestinal:  Positive for abdominal pain, diarrhea (resolved), heartburn and nausea. Negative for abdominal distention, anal bleeding, blood in stool, constipation, rectal pain and vomiting.       No LMP for male patient.  Past Medical History:   Diagnosis Date    Raynaud's syndrome without gangrene 03/22/2023    Vitiligo 03/22/2023     Past Surgical History:   Procedure Laterality Date    Knee Scope Left 2017     Family History   Problem Relation Age of Onset    No Known Problems Mother     Diabetes Father     Lupus Paternal Cousin      Social History     Tobacco Use    Smoking status: Never     Passive exposure: Never    Smokeless tobacco: Never   Substance Use Topics    Alcohol use: Not Currently    Drug use: Not Currently     Wt Readings from Last 10 Encounters:   04/09/24 121.7 kg (268 lb 4.8 oz)   04/08/24 120 kg (264 lb 8.8 oz)   12/03/23 117.9 kg  (260 lb)   11/13/23 118 kg (260 lb 2.3 oz)   11/08/23 117.9 kg (260 lb)   06/06/23 133.4 kg (294 lb 1.5 oz)   05/31/23 131.1 kg (289 lb)   05/12/23 135.7 kg (299 lb 2.6 oz)   03/31/23 135.2 kg (298 lb)   03/22/23 134.7 kg (297 lb)     Lab Results   Component Value Date    WBC 10.70 04/08/2024    HGB 13.7 (L) 04/08/2024    HCT 44.0 04/08/2024    MCV 84 04/08/2024     04/08/2024     CMP  Sodium   Date Value Ref Range Status   04/08/2024 140 136 - 145 mmol/L Final     Potassium   Date Value Ref Range Status   04/08/2024 4.0 3.5 - 5.1 mmol/L Final     Chloride   Date Value Ref Range Status   04/08/2024 104 95 - 110 mmol/L Final     CO2   Date Value Ref Range Status   04/08/2024 26 23 - 29 mmol/L Final     Glucose   Date Value Ref Range Status   04/08/2024 82 70 - 110 mg/dL Final     BUN   Date Value Ref Range Status   04/08/2024 9 6 - 20 mg/dL Final     Creatinine   Date Value Ref Range Status   04/08/2024 0.6 0.5 - 1.4 mg/dL Final     Calcium   Date Value Ref Range Status   04/08/2024 10.1 8.7 - 10.5 mg/dL Final     Total Protein   Date Value Ref Range Status   04/08/2024 8.2 6.0 - 8.4 g/dL Final     Albumin   Date Value Ref Range Status   04/08/2024 4.2 3.5 - 5.2 g/dL Final     Total Bilirubin   Date Value Ref Range Status   04/08/2024 0.7 0.1 - 1.0 mg/dL Final     Comment:     For infants and newborns, interpretation of results should be based  on gestational age, weight and in agreement with clinical  observations.    Premature Infant recommended reference ranges:  Up to 24 hours.............<8.0 mg/dL  Up to 48 hours............<12.0 mg/dL  3-5 days..................<15.0 mg/dL  6-29 days.................<15.0 mg/dL       Alkaline Phosphatase   Date Value Ref Range Status   04/08/2024 78 55 - 135 U/L Final     AST   Date Value Ref Range Status   04/08/2024 33 10 - 40 U/L Final     ALT   Date Value Ref Range Status   04/08/2024 30 10 - 44 U/L Final     Anion Gap   Date Value Ref Range Status   04/08/2024 10 8  "- 16 mmol/L Final     No results found for: "AMYLASE"  No results found for: "LIPASE"  No results found for: "LIPASERES"  Lab Results   Component Value Date    TSH 3.760 (H) 03/31/2023       Reviewed prior medical records including radiology report of CT chest 05/19/2023, esophagram complete 05/19/2023, CT abdomen pelvis 10/20/2023 and CT abdomen pelvis 12/03/2023 & endoscopy history (see surgical history).    Objective:      Physical Exam  Vitals and nursing note reviewed.   Constitutional:       General: He is not in acute distress.     Appearance: He is not ill-appearing.   HENT:      Head: Normocephalic and atraumatic.      Mouth/Throat:      Mouth: Mucous membranes are moist.      Pharynx: Oropharynx is clear.   Eyes:      Conjunctiva/sclera: Conjunctivae normal.   Cardiovascular:      Rate and Rhythm: Normal rate and regular rhythm.      Pulses: Normal pulses.   Pulmonary:      Effort: Pulmonary effort is normal. No respiratory distress.   Abdominal:      General: Bowel sounds are normal. There is no distension.      Palpations: Abdomen is soft.      Tenderness: There is no abdominal tenderness.   Skin:     General: Skin is warm and dry.      Capillary Refill: Capillary refill takes 2 to 3 seconds.   Neurological:      Mental Status: He is alert and oriented to person, place, and time.         Assessment:       1. Gastroesophageal reflux disease, unspecified whether esophagitis present    2. Diffuse systemic sclerosis    3. Heartburn    4. Epigastric pain    5. Diarrhea, unspecified type        Plan:       Gastroesophageal reflux disease, unspecified whether esophagitis present, Heartburn, Epigastric pain, Diarrhea, unspecified type  -discussed about the different types of medications used to treat reflux and how to use them, antacids can be used PRN for breakthrough heartburn symptoms by reducing stomach acid that is already produced, H2 blockers work by limiting the amount acid production, & PPI's work to " block acid production and are taken daily, patient verbalized understanding.  -Educated patient on lifestyle modifications to help control/reduce reflux/abdominal pain including: avoid large meals, avoid eating within 2-3 hours of bedtime (avoid late night eating & lying down soon after eating), elevate head of bed if nocturnal symptoms are present, smoking cessation (if current smoker), & weight loss (if overweight).   -Educated to avoid known foods which trigger reflux symptoms & to minimize/avoid high-fat foods, chocolate, caffeine, citrus, alcohol, & tomato products.  -Advised to avoid/limit use of NSAID's, since they can cause GI upset, bleeding, and/or ulcers. If needed, take with food.    - schedule EGD, discussed procedure with patient, including risks and benefits, patient verbalized understanding  - patient on CellCept for his scleroderma.  CellCept has known side effect of GI upset.  Did educate patient that if diarrhea was to return and stool studies are negative that he could reach out to his rheumatologist and discuss possible change to Myfortic.  - we will change Protonix to omeprazole in order to try to get his reflux symptoms under control  - patient complains that his symptoms are worse when he eats fatty foods and greasy foods such as hamburgers.  Will further investigate his symptoms with an abdominal ultrasound to ensure there are no abnormalities noted.  - we will order stool studies in case diarrhea symptoms return so patient can do stool studies to rule out infectious cause  -     omeprazole (PRILOSEC) 40 MG capsule; Take 1 capsule (40 mg total) by mouth once daily.  Dispense: 90 capsule; Refill: 3  -     US Abdomen Complete; Future; Expected date: 04/09/2024  -     Case Request Endoscopy: EGD (ESOPHAGOGASTRODUODENOSCOPY)    Diffuse systemic sclerosis  Continue to follow-up with Rheumatology for continued evaluation and management    Follow up if symptoms worsen or fail to improve.      If no  improvement in symptoms or symptoms worsen, call/follow-up at clinic or go to ER.       AUNDREA Blanc, JARONP-C    Encounter includes face to face time and non-face to face time preparing to see the patient (eg, review of tests), obtaining and/or reviewing separately obtained history, documenting clinical information in the electronic or other health record, independently interpreting results (not separately reported) and communicating results to the patient/family/caregiver, or care coordination (not separately reported).     A dictation software program was used for this note. Please expect some simple typographical errors in this note.

## 2024-04-08 ENCOUNTER — OFFICE VISIT (OUTPATIENT)
Dept: RHEUMATOLOGY | Facility: CLINIC | Age: 24
End: 2024-04-08
Payer: COMMERCIAL

## 2024-04-08 ENCOUNTER — LAB VISIT (OUTPATIENT)
Dept: LAB | Facility: HOSPITAL | Age: 24
End: 2024-04-08
Attending: INTERNAL MEDICINE

## 2024-04-08 VITALS
SYSTOLIC BLOOD PRESSURE: 119 MMHG | DIASTOLIC BLOOD PRESSURE: 70 MMHG | WEIGHT: 264.56 LBS | BODY MASS INDEX: 35.88 KG/M2 | HEART RATE: 72 BPM

## 2024-04-08 DIAGNOSIS — Z79.899 HIGH RISK MEDICATION USE: ICD-10-CM

## 2024-04-08 DIAGNOSIS — I73.01 RAYNAUD'S PHENOMENON WITH GANGRENE: ICD-10-CM

## 2024-04-08 DIAGNOSIS — M34.9 DIFFUSE SYSTEMIC SCLEROSIS: Primary | ICD-10-CM

## 2024-04-08 DIAGNOSIS — E66.01 CLASS 2 SEVERE OBESITY DUE TO EXCESS CALORIES WITH SERIOUS COMORBIDITY AND BODY MASS INDEX (BMI) OF 35.0 TO 35.9 IN ADULT: ICD-10-CM

## 2024-04-08 DIAGNOSIS — M34.9 DIFFUSE SYSTEMIC SCLEROSIS: ICD-10-CM

## 2024-04-08 PROBLEM — J22 LOWER RESPIRATORY INFECTION: Status: RESOLVED | Noted: 2023-05-31 | Resolved: 2024-04-08

## 2024-04-08 LAB
ALBUMIN SERPL BCP-MCNC: 4.2 G/DL (ref 3.5–5.2)
ALP SERPL-CCNC: 78 U/L (ref 55–135)
ALT SERPL W/O P-5'-P-CCNC: 30 U/L (ref 10–44)
ANION GAP SERPL CALC-SCNC: 10 MMOL/L (ref 8–16)
AST SERPL-CCNC: 33 U/L (ref 10–40)
BASOPHILS # BLD AUTO: 0.06 K/UL (ref 0–0.2)
BASOPHILS NFR BLD: 0.6 % (ref 0–1.9)
BILIRUB SERPL-MCNC: 0.7 MG/DL (ref 0.1–1)
BUN SERPL-MCNC: 9 MG/DL (ref 6–20)
CALCIUM SERPL-MCNC: 10.1 MG/DL (ref 8.7–10.5)
CHLORIDE SERPL-SCNC: 104 MMOL/L (ref 95–110)
CK SERPL-CCNC: 334 U/L (ref 20–200)
CO2 SERPL-SCNC: 26 MMOL/L (ref 23–29)
CREAT SERPL-MCNC: 0.6 MG/DL (ref 0.5–1.4)
CRP SERPL-MCNC: 9.3 MG/L (ref 0–8.2)
DIFFERENTIAL METHOD BLD: ABNORMAL
EOSINOPHIL # BLD AUTO: 0.2 K/UL (ref 0–0.5)
EOSINOPHIL NFR BLD: 1.7 % (ref 0–8)
ERYTHROCYTE [DISTWIDTH] IN BLOOD BY AUTOMATED COUNT: 14.2 % (ref 11.5–14.5)
ERYTHROCYTE [SEDIMENTATION RATE] IN BLOOD BY PHOTOMETRIC METHOD: 14 MM/HR (ref 0–23)
EST. GFR  (NO RACE VARIABLE): >60 ML/MIN/1.73 M^2
GLUCOSE SERPL-MCNC: 82 MG/DL (ref 70–110)
HBV CORE AB SERPL QL IA: NORMAL
HBV SURFACE AB SER-ACNC: 5.88 MIU/ML
HBV SURFACE AB SER-ACNC: NORMAL M[IU]/ML
HBV SURFACE AG SERPL QL IA: NORMAL
HCT VFR BLD AUTO: 44 % (ref 40–54)
HGB BLD-MCNC: 13.7 G/DL (ref 14–18)
IGA SERPL-MCNC: 331 MG/DL (ref 40–350)
IGG SERPL-MCNC: 1579 MG/DL (ref 650–1600)
IGM SERPL-MCNC: 74 MG/DL (ref 50–300)
IMM GRANULOCYTES # BLD AUTO: 0.03 K/UL (ref 0–0.04)
IMM GRANULOCYTES NFR BLD AUTO: 0.3 % (ref 0–0.5)
LYMPHOCYTES # BLD AUTO: 2 K/UL (ref 1–4.8)
LYMPHOCYTES NFR BLD: 18.5 % (ref 18–48)
MCH RBC QN AUTO: 26.1 PG (ref 27–31)
MCHC RBC AUTO-ENTMCNC: 31.1 G/DL (ref 32–36)
MCV RBC AUTO: 84 FL (ref 82–98)
MONOCYTES # BLD AUTO: 1.3 K/UL (ref 0.3–1)
MONOCYTES NFR BLD: 11.9 % (ref 4–15)
NEUTROPHILS # BLD AUTO: 7.2 K/UL (ref 1.8–7.7)
NEUTROPHILS NFR BLD: 67 % (ref 38–73)
NRBC BLD-RTO: 0 /100 WBC
PLATELET # BLD AUTO: 434 K/UL (ref 150–450)
PMV BLD AUTO: 10.1 FL (ref 9.2–12.9)
POTASSIUM SERPL-SCNC: 4 MMOL/L (ref 3.5–5.1)
PROT SERPL-MCNC: 8.2 G/DL (ref 6–8.4)
RBC # BLD AUTO: 5.25 M/UL (ref 4.6–6.2)
SODIUM SERPL-SCNC: 140 MMOL/L (ref 136–145)
WBC # BLD AUTO: 10.7 K/UL (ref 3.9–12.7)

## 2024-04-08 PROCEDURE — 99214 OFFICE O/P EST MOD 30 MIN: CPT | Mod: S$GLB,,, | Performed by: INTERNAL MEDICINE

## 2024-04-08 PROCEDURE — 83516 IMMUNOASSAY NONANTIBODY: CPT | Performed by: INTERNAL MEDICINE

## 2024-04-08 PROCEDURE — 3078F DIAST BP <80 MM HG: CPT | Mod: CPTII,S$GLB,, | Performed by: INTERNAL MEDICINE

## 2024-04-08 PROCEDURE — 85652 RBC SED RATE AUTOMATED: CPT | Performed by: INTERNAL MEDICINE

## 2024-04-08 PROCEDURE — 86235 NUCLEAR ANTIGEN ANTIBODY: CPT | Mod: 59 | Performed by: INTERNAL MEDICINE

## 2024-04-08 PROCEDURE — 87340 HEPATITIS B SURFACE AG IA: CPT | Performed by: INTERNAL MEDICINE

## 2024-04-08 PROCEDURE — 3008F BODY MASS INDEX DOCD: CPT | Mod: CPTII,S$GLB,, | Performed by: INTERNAL MEDICINE

## 2024-04-08 PROCEDURE — 86235 NUCLEAR ANTIGEN ANTIBODY: CPT | Performed by: INTERNAL MEDICINE

## 2024-04-08 PROCEDURE — 1159F MED LIST DOCD IN RCRD: CPT | Mod: CPTII,S$GLB,, | Performed by: INTERNAL MEDICINE

## 2024-04-08 PROCEDURE — 99999 PR PBB SHADOW E&M-EST. PATIENT-LVL III: CPT | Mod: PBBFAC,,, | Performed by: INTERNAL MEDICINE

## 2024-04-08 PROCEDURE — 36415 COLL VENOUS BLD VENIPUNCTURE: CPT | Performed by: INTERNAL MEDICINE

## 2024-04-08 PROCEDURE — 80053 COMPREHEN METABOLIC PANEL: CPT | Performed by: INTERNAL MEDICINE

## 2024-04-08 PROCEDURE — 83520 IMMUNOASSAY QUANT NOS NONAB: CPT | Performed by: INTERNAL MEDICINE

## 2024-04-08 PROCEDURE — 82784 ASSAY IGA/IGD/IGG/IGM EACH: CPT | Mod: 59 | Performed by: INTERNAL MEDICINE

## 2024-04-08 PROCEDURE — 86706 HEP B SURFACE ANTIBODY: CPT | Mod: 91 | Performed by: INTERNAL MEDICINE

## 2024-04-08 PROCEDURE — 86704 HEP B CORE ANTIBODY TOTAL: CPT | Performed by: INTERNAL MEDICINE

## 2024-04-08 PROCEDURE — 3074F SYST BP LT 130 MM HG: CPT | Mod: CPTII,S$GLB,, | Performed by: INTERNAL MEDICINE

## 2024-04-08 PROCEDURE — 85025 COMPLETE CBC W/AUTO DIFF WBC: CPT | Performed by: INTERNAL MEDICINE

## 2024-04-08 PROCEDURE — 86140 C-REACTIVE PROTEIN: CPT | Performed by: INTERNAL MEDICINE

## 2024-04-08 PROCEDURE — 82550 ASSAY OF CK (CPK): CPT | Performed by: INTERNAL MEDICINE

## 2024-04-08 RX ORDER — DIPHENHYDRAMINE HYDROCHLORIDE 50 MG/ML
50 INJECTION INTRAMUSCULAR; INTRAVENOUS ONCE AS NEEDED
Status: CANCELLED | OUTPATIENT
Start: 2024-04-22

## 2024-04-08 RX ORDER — MYCOPHENOLATE MOFETIL 500 MG/1
1500 TABLET ORAL 2 TIMES DAILY
COMMUNITY

## 2024-04-08 RX ORDER — ACETAMINOPHEN 325 MG/1
650 TABLET ORAL
Status: CANCELLED | OUTPATIENT
Start: 2024-04-22

## 2024-04-08 RX ORDER — FAMOTIDINE 10 MG/ML
20 INJECTION INTRAVENOUS
Status: CANCELLED | OUTPATIENT
Start: 2024-04-22

## 2024-04-08 RX ORDER — METHYLPREDNISOLONE SOD SUCC 125 MG
100 VIAL (EA) INJECTION
Status: CANCELLED | OUTPATIENT
Start: 2024-04-22

## 2024-04-08 RX ORDER — SODIUM CHLORIDE 0.9 % (FLUSH) 0.9 %
10 SYRINGE (ML) INJECTION
Status: CANCELLED | OUTPATIENT
Start: 2024-04-22

## 2024-04-08 RX ORDER — HEPARIN 100 UNIT/ML
500 SYRINGE INTRAVENOUS
Status: CANCELLED | OUTPATIENT
Start: 2024-04-22

## 2024-04-08 RX ORDER — EPINEPHRINE 0.3 MG/.3ML
0.3 INJECTION SUBCUTANEOUS ONCE AS NEEDED
Status: CANCELLED | OUTPATIENT
Start: 2024-04-22

## 2024-04-08 RX ORDER — MEPERIDINE HYDROCHLORIDE 50 MG/ML
25 INJECTION INTRAMUSCULAR; INTRAVENOUS; SUBCUTANEOUS
Status: CANCELLED | OUTPATIENT
Start: 2024-04-22 | End: 2024-04-23

## 2024-04-08 ASSESSMENT — ROUTINE ASSESSMENT OF PATIENT INDEX DATA (RAPID3)
PAIN SCORE: 2
PSYCHOLOGICAL DISTRESS SCORE: 1.1
FATIGUE SCORE: 0
TOTAL RAPID3 SCORE: 2.44
PATIENT GLOBAL ASSESSMENT SCORE: 4
MDHAQ FUNCTION SCORE: 0.4

## 2024-04-08 NOTE — ASSESSMENT & PLAN NOTE
Tolerating mycophenolate mofetil without infections ; needs lab to monitor chem and blood counts on mmf
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
## 2024-04-09 ENCOUNTER — PATIENT MESSAGE (OUTPATIENT)
Dept: RHEUMATOLOGY | Facility: CLINIC | Age: 24
End: 2024-04-09

## 2024-04-09 ENCOUNTER — OFFICE VISIT (OUTPATIENT)
Dept: GASTROENTEROLOGY | Facility: CLINIC | Age: 24
End: 2024-04-09
Payer: COMMERCIAL

## 2024-04-09 VITALS
SYSTOLIC BLOOD PRESSURE: 123 MMHG | HEART RATE: 77 BPM | HEIGHT: 72 IN | DIASTOLIC BLOOD PRESSURE: 72 MMHG | BODY MASS INDEX: 36.34 KG/M2 | WEIGHT: 268.31 LBS

## 2024-04-09 DIAGNOSIS — K21.9 GASTROESOPHAGEAL REFLUX DISEASE, UNSPECIFIED WHETHER ESOPHAGITIS PRESENT: Primary | ICD-10-CM

## 2024-04-09 DIAGNOSIS — M34.9 DIFFUSE SYSTEMIC SCLEROSIS: ICD-10-CM

## 2024-04-09 DIAGNOSIS — R19.7 DIARRHEA, UNSPECIFIED TYPE: ICD-10-CM

## 2024-04-09 DIAGNOSIS — R12 HEARTBURN: ICD-10-CM

## 2024-04-09 DIAGNOSIS — R10.13 EPIGASTRIC PAIN: ICD-10-CM

## 2024-04-09 LAB — ENA SCL70 IGG SER IA-ACNC: <0.2 U

## 2024-04-09 PROCEDURE — 3078F DIAST BP <80 MM HG: CPT | Mod: CPTII,S$GLB,,

## 2024-04-09 PROCEDURE — 3008F BODY MASS INDEX DOCD: CPT | Mod: CPTII,S$GLB,,

## 2024-04-09 PROCEDURE — 3074F SYST BP LT 130 MM HG: CPT | Mod: CPTII,S$GLB,,

## 2024-04-09 PROCEDURE — 99204 OFFICE O/P NEW MOD 45 MIN: CPT | Mod: S$GLB,,,

## 2024-04-09 PROCEDURE — 99999 PR PBB SHADOW E&M-EST. PATIENT-LVL III: CPT | Mod: PBBFAC,,,

## 2024-04-09 RX ORDER — OMEPRAZOLE 40 MG/1
40 CAPSULE, DELAYED RELEASE ORAL DAILY
Qty: 90 CAPSULE | Refills: 3 | Status: SHIPPED | OUTPATIENT
Start: 2024-04-09 | End: 2024-04-24

## 2024-04-09 NOTE — LETTER
April 9, 2024    Rony Silva  [id number] [address] [place] [address]             [facility]  Gastroenterology  [address]  [facility] [address]  [facility] [address]  Phone: [phone]   April 9, 2024     Patient: Rony Silva   YOB: 2000   Date of Visit: 4/9/2024       To Whom it May Concern:    Rony Silva was seen in my clinic on 4/9/2024. He may return to work on 4/10/24 .    Please excuse him from any classes or work missed.    If you have any questions or concerns, please don't hesitate to call.    Sincerely,         Sadie Franco, NP

## 2024-04-09 NOTE — LETTER
April 10, 2024    Rony Silva  [id number] [address] [place] [address]                April 10, 2024     Patient: Rony Silva   YOB: 2000   Date of Visit: 4/9/2024       To Whom it May Concern:    Rony Silva was seen in clinicby Dr. Koch on 4/9/2024. He  was accompanied by his mother-Consuelo Silva.    Please excuse  her from any  or work missed.    If you have any questions or concerns, please don't hesitate to call.    Sincerely,       Erika Jean Baptiste RN

## 2024-04-10 ENCOUNTER — TELEPHONE (OUTPATIENT)
Dept: GASTROENTEROLOGY | Facility: CLINIC | Age: 24
End: 2024-04-10
Payer: COMMERCIAL

## 2024-04-10 DIAGNOSIS — K21.9 GASTROESOPHAGEAL REFLUX DISEASE, UNSPECIFIED WHETHER ESOPHAGITIS PRESENT: Primary | ICD-10-CM

## 2024-04-10 LAB
ANTI SM/RNP ANTIBODY: 0.11 RATIO (ref 0–0.99)
ANTI-SM/RNP INTERPRETATION: NEGATIVE

## 2024-04-10 NOTE — TELEPHONE ENCOUNTER
Patient mother,Consuelo Silva, called wanting patient to establish care with Dr. Rice. Mother states that patient has scleroderma and will soon be starting treatment for this but needs a GI workup per Rheumatology. Patient saw GI Doctor yesterday in [place] where he was scheduled for a EGD for GERD. Discussed and chart reviewed by Dr. Rice and he states we can scheduled EGD for patient. Called patient mother and scheduled EGD on 04/24/24 at 1:30PM. Instructed mother that he needs to be NPO after midnight but can take BP, heart, lung, and thyroid meds with a sip of water. Also informed he will needs a  because he will be sedated. Patient mother verbalized good understanding.

## 2024-04-11 ENCOUNTER — CLINICAL SUPPORT (OUTPATIENT)
Dept: PULMONOLOGY | Facility: HOSPITAL | Age: 24
End: 2024-04-11
Attending: INTERNAL MEDICINE
Payer: COMMERCIAL

## 2024-04-11 VITALS — OXYGEN SATURATION: 97 %

## 2024-04-11 DIAGNOSIS — M34.9 DIFFUSE SYSTEMIC SCLEROSIS: ICD-10-CM

## 2024-04-11 PROCEDURE — 94060 EVALUATION OF WHEEZING: CPT

## 2024-04-11 PROCEDURE — 94729 DIFFUSING CAPACITY: CPT

## 2024-04-11 PROCEDURE — 94726 PLETHYSMOGRAPHY LUNG VOLUMES: CPT

## 2024-04-11 PROCEDURE — 27100098 HC SPACER

## 2024-04-11 NOTE — PROGRESS NOTES
PFT PROCEDURE EXPLAINED AND DEMONSTRATED. GOOD PATIENT EFFORT AND COOPERATION. ALBUTEROL ADMINISTERED AND TEST REPEATED TOLERATED WELL. D/C IN GOOD CONDITION.

## 2024-04-12 LAB — ENA SCL70 AB SER-ACNC: <0.6 U/ML

## 2024-04-15 DIAGNOSIS — M34.9 DIFFUSE SYSTEMIC SCLEROSIS: ICD-10-CM

## 2024-04-15 DIAGNOSIS — J84.9 INTERSTITIAL PULMONARY DISEASE, UNSPECIFIED: Primary | ICD-10-CM

## 2024-04-15 DIAGNOSIS — J98.4 RESTRICTIVE LUNG DISEASE: ICD-10-CM

## 2024-04-15 PROCEDURE — 94060 EVALUATION OF WHEEZING: CPT | Mod: 26,,, | Performed by: STUDENT IN AN ORGANIZED HEALTH CARE EDUCATION/TRAINING PROGRAM

## 2024-04-15 PROCEDURE — 94729 DIFFUSING CAPACITY: CPT | Mod: 26,,, | Performed by: STUDENT IN AN ORGANIZED HEALTH CARE EDUCATION/TRAINING PROGRAM

## 2024-04-15 PROCEDURE — 94726 PLETHYSMOGRAPHY LUNG VOLUMES: CPT | Mod: 26,,, | Performed by: STUDENT IN AN ORGANIZED HEALTH CARE EDUCATION/TRAINING PROGRAM

## 2024-04-15 NOTE — PROCEDURES
PFT REPORT:  SPIROMETRY:  The pre-BD FVC is decreased, 4.04L/-2.78 Z score.  The pre-BD FEV1 is decreased, 4.98L/-2.08 Z score.  Thre pre-BD FEV1/FVC ratio is 94/1.52 Z score.    The post-BD FVC is decreased, 4.02L/-2.81 Z score.  The post-BD FEV1 is decreased, 3.79L/-2.07 Z score.  The post-BD FEV1/FVC ratio is 94/1.52 Z score.    There is no significant bronchodilator effect.  There is an inspiratory limb plateau.    LUNG VOLUMES:  The TLC is decreased, 5.24L/-2.09 Z score.  The FRC is normal, 3.44L/-0.12 Z score.  The RV is normal, 1.15L/-0.91 Z score.    DIFFUSION CAPACITY:  The diffusion capacity is corrected for hemoglobin and is decreased, 23.36/-2.74 Z score.    Interpretation:  The post-BD spirometry shows no obstructive defect.  There is no significant response to bronchodilators. Lack of a bronchodilator response in the lab does not predict clinical response to bronchodilator therapy.  The presence of an inspiratory limb plateau in the flow volume loop may be consistent with a variable extrathoracic airways obstruction or poor patient effort. Clinical correlation advised.  There is a mild restrictive defect.   There is a moderate diffusion defect. Decreased diffusion capacity is suggestive of pulmonary vascular disease, emphysema, interstitial lung disease and/or anemia. Clinical correlation is advised.       Adore Jurado MD  Pulmonary Medicine  [facility]

## 2024-04-18 DIAGNOSIS — J98.4 RESTRICTIVE LUNG DISEASE: Primary | ICD-10-CM

## 2024-04-18 LAB — RNAP III AB SER-ACNC: <20 UNITS

## 2024-04-19 ENCOUNTER — HOSPITAL ENCOUNTER (OUTPATIENT)
Dept: RADIOLOGY | Facility: HOSPITAL | Age: 24
Discharge: HOME | End: 2024-04-19
Attending: INTERNAL MEDICINE
Payer: COMMERCIAL

## 2024-04-19 DIAGNOSIS — J98.4 RESTRICTIVE LUNG DISEASE: ICD-10-CM

## 2024-04-19 DIAGNOSIS — J84.9 INTERSTITIAL PULMONARY DISEASE, UNSPECIFIED: ICD-10-CM

## 2024-04-19 DIAGNOSIS — M34.9 DIFFUSE SYSTEMIC SCLEROSIS: ICD-10-CM

## 2024-04-19 PROCEDURE — 71250 CT THORAX DX C-: CPT | Mod: 26,,, | Performed by: RADIOLOGY

## 2024-04-19 PROCEDURE — 71250 CT THORAX DX C-: CPT | Mod: TC

## 2024-04-22 ENCOUNTER — HOSPITAL ENCOUNTER (OUTPATIENT)
Dept: CARDIOLOGY | Facility: HOSPITAL | Age: 24
Discharge: HOME | End: 2024-04-22
Attending: INTERNAL MEDICINE
Payer: COMMERCIAL

## 2024-04-22 VITALS — HEIGHT: 72 IN | BODY MASS INDEX: 36.3 KG/M2 | WEIGHT: 268 LBS

## 2024-04-22 DIAGNOSIS — J98.4 RESTRICTIVE LUNG DISEASE: ICD-10-CM

## 2024-04-22 LAB
AORTIC ROOT ANNULUS: 2.83 CM
AORTIC VALVE CUSP SEPERATION: 1.76 CM
AV INDEX (PROSTH): 0.87
AV MEAN GRADIENT: 4 MMHG
AV PEAK GRADIENT: 8 MMHG
AV VALVE AREA BY VELOCITY RATIO: 2.84 CM²
AV VALVE AREA: 2.75 CM²
AV VELOCITY RATIO: 0.9
BSA FOR ECHO PROCEDURE: 2.48 M2
CV ECHO LV RWT: 0.49 CM
DOP CALC AO PEAK VEL: 1.43 M/S
DOP CALC AO VTI: 26.9 CM
DOP CALC LVOT AREA: 3.2 CM2
DOP CALC LVOT DIAMETER: 2.01 CM
DOP CALC LVOT PEAK VEL: 1.28 M/S
DOP CALC LVOT STROKE VOLUME: 73.9 CM3
DOP CALCLVOT PEAK VEL VTI: 23.3 CM
E WAVE DECELERATION TIME: 178.91 MSEC
E/A RATIO: 1.44
E/E' RATIO: 6.36 M/S
ECHO LV POSTERIOR WALL: 1.14 CM (ref 0.6–1.1)
EJECTION FRACTION: 70 %
FRACTIONAL SHORTENING: 43 % (ref 28–44)
INTERVENTRICULAR SEPTUM: 1 CM (ref 0.6–1.1)
IVC DIAMETER: 2.72 CM
LEFT ATRIUM SIZE: 3.8 CM
LEFT ATRIUM VOLUME INDEX MOD: 18 ML/M2
LEFT ATRIUM VOLUME MOD: 43.45 CM3
LEFT INTERNAL DIMENSION IN SYSTOLE: 2.62 CM (ref 2.1–4)
LEFT VENTRICLE DIASTOLIC VOLUME INDEX: 40.98 ML/M2
LEFT VENTRICLE DIASTOLIC VOLUME: 98.75 ML
LEFT VENTRICLE MASS INDEX: 73 G/M2
LEFT VENTRICLE SYSTOLIC VOLUME INDEX: 10.4 ML/M2
LEFT VENTRICLE SYSTOLIC VOLUME: 25.09 ML
LEFT VENTRICULAR INTERNAL DIMENSION IN DIASTOLE: 4.63 CM (ref 3.5–6)
LEFT VENTRICULAR MASS: 176.18 G
LV LATERAL E/E' RATIO: 5.83 M/S
LV SEPTAL E/E' RATIO: 7 M/S
LVOT MG: 3.46 MMHG
LVOT MV: 0.88 CM/S
MV PEAK A VEL: 0.73 M/S
MV PEAK E VEL: 1.05 M/S
MV STENOSIS PRESSURE HALF TIME: 51.88 MS
MV VALVE AREA P 1/2 METHOD: 4.24 CM2
OHS CV RV/LV RATIO: 0.78 CM
OHS LV EJECTION FRACTION SIMPSONS BIPLANE MOD: 65 %
PISA TR MAX VEL: 2.83 M/S
PV PEAK GRADIENT: 4 MMHG
PV PEAK VELOCITY: 1.06 M/S
RA VOL SYS: 54.34 ML
RIGHT ATRIAL AREA: 19.1 CM2
RIGHT VENTRICLE DIASTOLIC LENGTH: 8.2 CM
RIGHT VENTRICLE DIASTOLIC MID DIMENSION: 1.7 CM
RIGHT VENTRICULAR END-DIASTOLIC DIMENSION: 3.6 CM
RIGHT VENTRICULAR LENGTH IN DIASTOLE (APICAL 4-CHAMBER VIEW): 8.18 CM
RV MID DIAMA: 1.73 CM
TDI LATERAL: 0.18 M/S
TDI SEPTAL: 0.15 M/S
TDI: 0.17 M/S
TR MAX PG: 32 MMHG
TRICUSPID ANNULAR PLANE SYSTOLIC EXCURSION: 3.18 CM
Z-SCORE OF LEFT VENTRICULAR DIMENSION IN END DIASTOLE: -8.82
Z-SCORE OF LEFT VENTRICULAR DIMENSION IN END SYSTOLE: -7.41

## 2024-04-22 PROCEDURE — 93306 TTE W/DOPPLER COMPLETE: CPT | Mod: 26,,, | Performed by: INTERNAL MEDICINE

## 2024-04-22 PROCEDURE — 93306 TTE W/DOPPLER COMPLETE: CPT

## 2024-04-24 ENCOUNTER — ANESTHESIA EVENT (OUTPATIENT)
Dept: GASTROENTEROLOGY | Facility: HOSPITAL | Age: 24
End: 2024-04-24
Payer: COMMERCIAL

## 2024-04-24 ENCOUNTER — ANESTHESIA (OUTPATIENT)
Dept: GASTROENTEROLOGY | Facility: HOSPITAL | Age: 24
End: 2024-04-24
Payer: COMMERCIAL

## 2024-04-24 ENCOUNTER — HOSPITAL ENCOUNTER (OUTPATIENT)
Dept: GASTROENTEROLOGY | Facility: HOSPITAL | Age: 24
Discharge: HOME | End: 2024-04-24
Attending: INTERNAL MEDICINE | Admitting: INTERNAL MEDICINE
Payer: COMMERCIAL

## 2024-04-24 VITALS
RESPIRATION RATE: 14 BRPM | OXYGEN SATURATION: 100 % | TEMPERATURE: 98 F | HEIGHT: 72 IN | SYSTOLIC BLOOD PRESSURE: 134 MMHG | DIASTOLIC BLOOD PRESSURE: 79 MMHG | HEART RATE: 79 BPM | BODY MASS INDEX: 35.21 KG/M2 | WEIGHT: 260 LBS

## 2024-04-24 DIAGNOSIS — R13.10 DYSPHAGIA, UNSPECIFIED TYPE: Primary | ICD-10-CM

## 2024-04-24 DIAGNOSIS — K20.80 LOS ANGELES GRADE D ESOPHAGITIS: ICD-10-CM

## 2024-04-24 DIAGNOSIS — K21.9 GASTROESOPHAGEAL REFLUX DISEASE, UNSPECIFIED WHETHER ESOPHAGITIS PRESENT: ICD-10-CM

## 2024-04-24 LAB — TH/TO: NEGATIVE

## 2024-04-24 PROCEDURE — 88305 TISSUE EXAM BY PATHOLOGIST: CPT | Mod: TC,SUR | Performed by: INTERNAL MEDICINE

## 2024-04-24 PROCEDURE — 25000003 PHARM REV CODE 250: Performed by: NURSE ANESTHETIST, CERTIFIED REGISTERED

## 2024-04-24 PROCEDURE — 27201423 OPTIME MED/SURG SUP & DEVICES STERILE SUPPLY

## 2024-04-24 PROCEDURE — 88342 IMHCHEM/IMCYTCHM 1ST ANTB: CPT | Mod: 26,,, | Performed by: PATHOLOGY

## 2024-04-24 PROCEDURE — 88341 IMHCHEM/IMCYTCHM EA ADD ANTB: CPT | Mod: 26,,, | Performed by: PATHOLOGY

## 2024-04-24 PROCEDURE — 43239 EGD BIOPSY SINGLE/MULTIPLE: CPT | Performed by: INTERNAL MEDICINE

## 2024-04-24 PROCEDURE — 37000008 HC ANESTHESIA 1ST 15 MINUTES

## 2024-04-24 PROCEDURE — 37000009 HC ANESTHESIA EA ADD 15 MINS

## 2024-04-24 PROCEDURE — 88305 TISSUE EXAM BY PATHOLOGIST: CPT | Mod: 26,,, | Performed by: PATHOLOGY

## 2024-04-24 PROCEDURE — 27000284 HC CANNULA NASAL: Performed by: NURSE ANESTHETIST, CERTIFIED REGISTERED

## 2024-04-24 PROCEDURE — D9220A PRA ANESTHESIA: Mod: ,,, | Performed by: NURSE ANESTHETIST, CERTIFIED REGISTERED

## 2024-04-24 PROCEDURE — 88312 SPECIAL STAINS GROUP 1: CPT | Mod: 26,,, | Performed by: PATHOLOGY

## 2024-04-24 PROCEDURE — 88312 SPECIAL STAINS GROUP 1: CPT | Mod: TC,SUR | Performed by: INTERNAL MEDICINE

## 2024-04-24 PROCEDURE — 43239 EGD BIOPSY SINGLE/MULTIPLE: CPT | Mod: ,,, | Performed by: INTERNAL MEDICINE

## 2024-04-24 RX ORDER — SODIUM CHLORIDE 0.9 % (FLUSH) 0.9 %
10 SYRINGE (ML) INJECTION
Status: DISCONTINUED | OUTPATIENT
Start: 2024-04-24 | End: 2024-04-25 | Stop reason: HOSPADM

## 2024-04-24 RX ORDER — LIDOCAINE HYDROCHLORIDE 20 MG/ML
INJECTION, SOLUTION EPIDURAL; INFILTRATION; INTRACAUDAL; PERINEURAL
Status: DISCONTINUED | OUTPATIENT
Start: 2024-04-24 | End: 2024-04-24

## 2024-04-24 RX ORDER — PROPOFOL 10 MG/ML
VIAL (ML) INTRAVENOUS
Status: DISCONTINUED | OUTPATIENT
Start: 2024-04-24 | End: 2024-04-24

## 2024-04-24 RX ORDER — SUCRALFATE 1 G/1
1 TABLET ORAL 4 TIMES DAILY
Qty: 56 TABLET | Refills: 0 | Status: SHIPPED | OUTPATIENT
Start: 2024-04-24 | End: 2024-05-08

## 2024-04-24 RX ORDER — OMEPRAZOLE 40 MG/1
40 CAPSULE, DELAYED RELEASE ORAL
Qty: 180 CAPSULE | Refills: 3 | Status: SHIPPED | OUTPATIENT
Start: 2024-04-24 | End: 2025-04-24

## 2024-04-24 RX ADMIN — Medication 50 MG: at 01:04

## 2024-04-24 RX ADMIN — Medication 150 MG: at 01:04

## 2024-04-24 RX ADMIN — LIDOCAINE HYDROCHLORIDE 100 MG: 20 INJECTION, SOLUTION INTRAVENOUS at 01:04

## 2024-04-24 RX ADMIN — SODIUM CHLORIDE: 9 INJECTION, SOLUTION INTRAVENOUS at 01:04

## 2024-04-24 NOTE — ANESTHESIA PREPROCEDURE EVALUATION
04/24/2024  Rony Silva is a 23 y.o., male.    Past Medical History:   Diagnosis Date    Raynaud's syndrome without gangrene 03/22/2023    Vitiligo 03/22/2023       Past Surgical History:   Procedure Laterality Date    Knee Scope Left 2017       Family History   Problem Relation Name Age of Onset    No Known Problems Mother      Diabetes Father      Lupus Paternal Cousin         Social History     Socioeconomic History    Marital status: Single   Tobacco Use    Smoking status: Never     Passive exposure: Never    Smokeless tobacco: Never   Substance and Sexual Activity    Alcohol use: Not Currently    Drug use: Not Currently    Sexual activity: Yes     Partners: Female     Birth control/protection: OCP     Social Determinants of Health     Financial Resource Strain: Patient Declined (4/3/2024)    Overall Financial Resource Strain (CARDIA)     Difficulty of Paying Living Expenses: Patient declined   Food Insecurity: Patient Declined (4/3/2024)    Hunger Vital Sign     Worried About Running Out of Food in the Last Year: Patient declined     Ran Out of Food in the Last Year: Patient declined   Transportation Needs: Patient Declined (4/3/2024)    PRAPARE - Transportation     Lack of Transportation (Medical): Patient declined     Lack of Transportation (Non-Medical): Patient declined   Stress: No Stress Concern Present (4/3/2024)    [employer] - Occupational Stress Questionnaire     Feeling of Stress : Not at all   Social Connections: Unknown (4/3/2024)    Social Connection and Isolation Panel [NHANES]     Frequency of Communication with Friends and Family: More than three times a week     Frequency of Social Gatherings with Friends and Family: Twice a week     Active Member of Clubs or Organizations: No     Attends Club or Organization Meetings: Never     Marital Status: Never     Housing Stability: Low Risk  (4/3/2024)    Housing Stability Vital Sign     Unable to Pay for Housing in the Last Year: No     Number of Places Lived in the Last Year: 1     Unstable Housing in the Last Year: No       Current Outpatient Medications   Medication Sig Dispense Refill    amLODIPine (NORVASC) 5 MG tablet Take 1 tablet (5 mg total) by mouth once daily. 30 tablet 5    aspirin (ECOTRIN) 81 MG EC tablet Take 81 mg by mouth once daily.      HYDROmorphone (DILAUDID) 2 MG tablet Take 2 mg by mouth every 12 (twelve) hours as needed for Pain. (Patient not taking: Reported on 4/9/2024)      ibuprofen (ADVIL,MOTRIN) 600 MG tablet Take 1 tablet (600 mg total) by mouth every 6 (six) hours as needed for Pain. 20 tablet 0    Lactobacillus rhamnosus GG (CULTURELLE) 10 billion cell capsule Take 1 capsule by mouth once daily.      mycophenolate (CELLCEPT) 500 mg Tab Take 1,500 mg by mouth 2 (two) times daily.      omeprazole (PRILOSEC) 40 MG capsule Take 1 capsule (40 mg total) by mouth once daily. 90 capsule 3    traMADoL (ULTRAM) 50 mg tablet Take 1 tablet (50 mg total) by mouth every 6 (six) hours as needed for Pain. (Patient not taking: Reported on 4/8/2024) 12 tablet 0     No current facility-administered medications for this encounter.       Review of patient's allergies indicates:   Allergen Reactions    Tylenol [acetaminophen]     Acetomenaphthone Other (See Comments)     Elevated liver enzymes when in childhood        Pre-op Assessment    I have reviewed the Patient Summary Reports.     I have reviewed the Nursing Notes. I have reviewed the NPO Status.   I have reviewed the Medications.     Review of Systems  Anesthesia Hx:  No problems with previous Anesthesia                    Physical Exam    Airway:  Mallampati: II   Mouth Opening: Normal  TM Distance: Normal  Tongue: Normal  Neck ROM: Normal ROM        Anesthesia Plan  Type of Anesthesia, risks & benefits discussed:    Anesthesia Type: Gen Natural  Airway, MAC  Intra-op Monitoring Plan: Standard ASA Monitors  Post Op Pain Control Plan: multimodal analgesia  Induction:  IV  Informed Consent: Informed consent signed with the Patient and all parties understand the risks and agree with anesthesia plan.  All questions answered. Patient consented to blood products? Yes  ASA Score: 2  Day of Surgery Review of History & Physical: H&P Update referred to the surgeon/provider.I have interviewed and examined the patient. I have reviewed the patient's H&P dated: There are no significant changes.     Ready For Surgery From Anesthesia Perspective.     .

## 2024-04-24 NOTE — H&P
Gastroenterology Pre-procedure H&P    History of Present Illness    Rony Silva is a 23 y.o. male that  has a past medical history of Raynaud's syndrome without gangrene (03/22/2023) and Vitiligo (03/22/2023).     Patient with scleroderma, GERD, dysphagia here for index EGD. Refractory to protonix, recently switched to prilosec       Past Medical History:   Diagnosis Date    Raynaud's syndrome without gangrene 03/22/2023    Vitiligo 03/22/2023       Past Surgical History:   Procedure Laterality Date    Knee Scope Left 2017       Family History   Problem Relation Name Age of Onset    No Known Problems Mother      Diabetes Father      Lupus Paternal Cousin         Social History     Socioeconomic History    Marital status: Single   Tobacco Use    Smoking status: Never     Passive exposure: Never    Smokeless tobacco: Never   Substance and Sexual Activity    Alcohol use: Not Currently    Drug use: Not Currently    Sexual activity: Yes     Partners: Female     Birth control/protection: OCP     Social Determinants of Health     Financial Resource Strain: Patient Declined (4/3/2024)    Overall Financial Resource Strain (CARDIA)     Difficulty of Paying Living Expenses: Patient declined   Food Insecurity: Patient Declined (4/3/2024)    Hunger Vital Sign     Worried About Running Out of Food in the Last Year: Patient declined     Ran Out of Food in the Last Year: Patient declined   Transportation Needs: Patient Declined (4/3/2024)    PRAPARE - Transportation     Lack of Transportation (Medical): Patient declined     Lack of Transportation (Non-Medical): Patient declined   Stress: No Stress Concern Present (4/3/2024)    [employer] - Occupational Stress Questionnaire     Feeling of Stress : Not at all   Social Connections: Unknown (4/3/2024)    Social Connection and Isolation Panel [NHANES]     Frequency of Communication with Friends and Family: More than three times a week     Frequency of  Social Gatherings with Friends and Family: Twice a week     Active Member of Clubs or Organizations: No     Attends Club or Organization Meetings: Never     Marital Status: Never    Housing Stability: Low Risk  (4/3/2024)    Housing Stability Vital Sign     Unable to Pay for Housing in the Last Year: No     Number of Places Lived in the Last Year: 1     Unstable Housing in the Last Year: No       Current Outpatient Medications   Medication Sig Dispense Refill    amLODIPine (NORVASC) 5 MG tablet Take 1 tablet (5 mg total) by mouth once daily. 30 tablet 5    aspirin (ECOTRIN) 81 MG EC tablet Take 81 mg by mouth once daily.      HYDROmorphone (DILAUDID) 2 MG tablet Take 2 mg by mouth every 12 (twelve) hours as needed for Pain. (Patient not taking: Reported on 4/9/2024)      ibuprofen (ADVIL,MOTRIN) 600 MG tablet Take 1 tablet (600 mg total) by mouth every 6 (six) hours as needed for Pain. 20 tablet 0    Lactobacillus rhamnosus GG (CULTURELLE) 10 billion cell capsule Take 1 capsule by mouth once daily.      mycophenolate (CELLCEPT) 500 mg Tab Take 1,500 mg by mouth 2 (two) times daily.      omeprazole (PRILOSEC) 40 MG capsule Take 1 capsule (40 mg total) by mouth once daily. 90 capsule 3    traMADoL (ULTRAM) 50 mg tablet Take 1 tablet (50 mg total) by mouth every 6 (six) hours as needed for Pain. (Patient not taking: Reported on 4/8/2024) 12 tablet 0     No current facility-administered medications for this encounter.       Review of patient's allergies indicates:   Allergen Reactions    Tylenol [acetaminophen]     Acetomenaphthone Other (See Comments)     Elevated liver enzymes when in childhood       Objective:  Vitals:    04/24/24 1252   BP: 123/63   Pulse: 84   Resp: 16   Temp: 98.1 °F (36.7 °C)   TempSrc: Oral   SpO2: 98%   Weight: 117.9 kg (260 lb)   Height: 6' (1.829 m)        GEN: normal appearing, NAD, AAO x3  HENT: NCAT, anicteric, OP benign  CV: normal rate, regular rhythm  RESP: CTA, symmetric rise,  unlabored  ABD: soft, ND, no guarding or TTP  SKIN: warm and dry  NEURO: grossly afocal    Assessment and Plan:    Proceed with:    EGD for dysphagia, GERD      Kael Rice MD  Gastroenterology

## 2024-04-24 NOTE — TRANSFER OF CARE
Anesthesia Transfer of Care Note    Patient: Rony Silva    Procedure(s) Performed: EGD    Patient location: GI    Anesthesia Type: general    Transport from OR: Transported from OR on room air with adequate spontaneous ventilation. Continuous ECG monitoring in transport. Continuous SpO2 monitoring in transport    Post pain: adequate analgesia    Post assessment: no apparent anesthetic complications and tolerated procedure well    Post vital signs: stable    Level of consciousness: responds to stimulation and sedated    Nausea/Vomiting: no nausea/vomiting    Complications: none    Transfer of care protocol was followed      Last vitals: Visit Vitals  BP 98/61 (BP Location: Right arm, Patient Position: Lying)   Pulse 86   Temp 36.7 °C (98.1 °F) (Oral)   Resp 17   Ht 6' (1.829 m)   Wt 117.9 kg (260 lb)   SpO2 100%   BMI 35.26 kg/m²

## 2024-04-24 NOTE — ANESTHESIA POSTPROCEDURE EVALUATION
Anesthesia Post Evaluation    Patient: Rony Silva    Procedure(s) Performed: EGD    Final Anesthesia Type: general      Patient location during evaluation: GI PACU  Patient participation: Yes- Able to Participate  Level of consciousness: awake and alert  Post-procedure vital signs: reviewed and stable  Pain management: adequate  Airway patency: patent  KAYLA mitigation strategies: Multimodal analgesia  PONV status at discharge: No PONV  Anesthetic complications: no      Cardiovascular status: stable  Respiratory status: unassisted  Hydration status: euvolemic  Follow-up not needed.              Vitals Value Taken Time   /80 04/24/24 1409   Temp  04/24/24 1410   Pulse 96 04/24/24 1409   Resp 30 04/24/24 1409   SpO2 100 % 04/24/24 1409   Vitals shown include unfiled device data.      No case tracking events are documented in the log.      Pain/Silverio Score: Silverio Score: 9 (4/24/2024  2:01 PM)

## 2024-04-24 NOTE — DISCHARGE INSTRUCTIONS
Procedure Date  4/24/24     Impression  Overall Impression:   Grade D esophagitis in the middle third of the esophagus, lower third of the esophagus and GE junction; performed cold forceps biopsy  Peptic stricture with length of less than 1 cm and diameter of 13 mm in the GE junction  Small type I hiatal hernia  Mild abnormal mucosa, consistent with gastritis in the fundus of the stomach and antrum; performed cold forceps biopsies  The upper third of the esophagus, cardia, body of the stomach, incisura, duodenal bulb, 1st part of the duodenum and 2nd part of the duodenum appeared normal. Performed random biopsy to rule out celiac disease.        Recommendation  Await pathology results    Increase Prilosec to twice daily dosing  Use pepcid 20 mg up to 3x/day for breakthrough reflux  Start carafate 4x/day for 2 weeks    Schedule repeat EGD in 3 months to assess for esophageal healing and potential esophageal dilation - JULY 16, 2024 @ 10:45 AM     Follow up in GI clinic as scheduled or sooner PRN -  SEPTEMBER 3, 2024 @ 2:20 PM      Outcome of procedure: successful EGD  Disposition: patient to recovery following procedure; discharge to home when appropriate parameters met  Provisions for follow up: please call my office for any unexpected symptoms like chest or abdominal pain or bleeding following your procedure.  Final Diagnosis: GERD with esophagitis, scleroderma     No driving today, no operating heavy machinery, no signing any legal documents until tomorrow.    Drink lots of fluids, resume regular diet.  Take your normal medications.     Please call our office for any nausea, vomiting or abdominal pain.

## 2024-04-25 LAB
DHEA SERPL-MCNC: NORMAL
ESTROGEN SERPL-MCNC: NORMAL PG/ML
INSULIN SERPL-ACNC: NORMAL U[IU]/ML
LAB AP GROSS DESCRIPTION: NORMAL
LAB AP LABORATORY NOTES: NORMAL
T3RU NFR SERPL: NORMAL %

## 2024-04-30 ENCOUNTER — PATIENT MESSAGE (OUTPATIENT)
Dept: RHEUMATOLOGY | Facility: CLINIC | Age: 24
End: 2024-04-30

## 2024-05-24 ENCOUNTER — TELEPHONE (OUTPATIENT)
Dept: RHEUMATOLOGY | Facility: CLINIC | Age: 24
End: 2024-05-24

## 2024-05-24 NOTE — TELEPHONE ENCOUNTER
Called [facility] ([phone]) regarding PA Case # [id number]. Letter faxed by Dr. Koch' office on 5/23/24.     Spoke to Leti (call reference # [id number]), PA representative. Reaching out to Dr. Koch regarding availability next week (week of May 27) for a peer to peer.     Update: Spoke to Crystal from [facility] (). Provided Dr. Koch' availability for May 28, 30, and 31 afternoon. Rep will set up Peer to Peer based off  Physician's availability as well.     Contact information for Rheum office and myself provided to .

## 2024-06-03 ENCOUNTER — TELEPHONE (OUTPATIENT)
Dept: PHARMACY | Facility: HOSPITAL | Age: 24
End: 2024-06-03

## 2024-06-03 NOTE — TELEPHONE ENCOUNTER
Called [facility] ([phone]) regarding PA Case # [id number]. Letter faxed by Dr. Koch' office on 5/23/24 and 5/31/24. Confirmed receipt of letter and attached to case.     Spoke to Sujatha (call reference # [id number]), PA representative. Provided Dr. Koch' availabilities for this week: 6/4, 6/5/, 6/6 for Peer to Peer.     Secondary contact info (my number) also provided to CC.

## 2024-06-03 NOTE — Clinical Note
Called [facility] ([phone]) regarding PA Case # [id number]. Letter faxed by Dr. Koch' office on 5/23/24 and 5/31/24 - confirmed receipt of letter today.      Spoke to Sujatha (call reference # [id number]), PA representative. Provided Dr. Koch' availabilities this week for a Peer to Peer. Unable to correspond with CC last week.     Contact information for Rheum office and myself provided to CC.

## 2024-06-19 ENCOUNTER — INFUSION (OUTPATIENT)
Dept: INFUSION THERAPY | Facility: HOSPITAL | Age: 24
End: 2024-06-19
Payer: COMMERCIAL

## 2024-06-19 VITALS
TEMPERATURE: 99 F | OXYGEN SATURATION: 96 % | BODY MASS INDEX: 35.76 KG/M2 | SYSTOLIC BLOOD PRESSURE: 137 MMHG | HEART RATE: 77 BPM | HEIGHT: 72 IN | RESPIRATION RATE: 16 BRPM | DIASTOLIC BLOOD PRESSURE: 63 MMHG | WEIGHT: 264 LBS

## 2024-06-19 DIAGNOSIS — M34.9 DIFFUSE SYSTEMIC SCLEROSIS: Primary | ICD-10-CM

## 2024-06-19 PROCEDURE — 96367 TX/PROPH/DG ADDL SEQ IV INF: CPT

## 2024-06-19 PROCEDURE — 96375 TX/PRO/DX INJ NEW DRUG ADDON: CPT

## 2024-06-19 PROCEDURE — 25000003 PHARM REV CODE 250: Performed by: INTERNAL MEDICINE

## 2024-06-19 PROCEDURE — 63600175 PHARM REV CODE 636 W HCPCS: Performed by: INTERNAL MEDICINE

## 2024-06-19 PROCEDURE — 96415 CHEMO IV INFUSION ADDL HR: CPT

## 2024-06-19 PROCEDURE — 96413 CHEMO IV INFUSION 1 HR: CPT

## 2024-06-19 RX ORDER — DIPHENHYDRAMINE HYDROCHLORIDE 50 MG/ML
50 INJECTION INTRAMUSCULAR; INTRAVENOUS ONCE AS NEEDED
OUTPATIENT
Start: 2024-07-03

## 2024-06-19 RX ORDER — PROCHLORPERAZINE EDISYLATE 5 MG/ML
5 INJECTION INTRAMUSCULAR; INTRAVENOUS
Status: COMPLETED | OUTPATIENT
Start: 2024-06-19 | End: 2024-06-19

## 2024-06-19 RX ORDER — ACETAMINOPHEN 325 MG/1
650 TABLET ORAL
Status: DISCONTINUED | OUTPATIENT
Start: 2024-06-19 | End: 2024-06-19 | Stop reason: HOSPADM

## 2024-06-19 RX ORDER — MEPERIDINE HYDROCHLORIDE 50 MG/ML
25 INJECTION INTRAMUSCULAR; INTRAVENOUS; SUBCUTANEOUS
OUTPATIENT
Start: 2024-07-03 | End: 2024-07-04

## 2024-06-19 RX ORDER — FAMOTIDINE 10 MG/ML
20 INJECTION INTRAVENOUS
OUTPATIENT
Start: 2024-07-03

## 2024-06-19 RX ORDER — SODIUM CHLORIDE 0.9 % (FLUSH) 0.9 %
10 SYRINGE (ML) INJECTION
Status: DISCONTINUED | OUTPATIENT
Start: 2024-06-19 | End: 2024-06-19 | Stop reason: HOSPADM

## 2024-06-19 RX ORDER — HEPARIN 100 UNIT/ML
500 SYRINGE INTRAVENOUS
Status: DISCONTINUED | OUTPATIENT
Start: 2024-06-19 | End: 2024-06-19 | Stop reason: HOSPADM

## 2024-06-19 RX ORDER — SODIUM CHLORIDE 0.9 % (FLUSH) 0.9 %
10 SYRINGE (ML) INJECTION
OUTPATIENT
Start: 2024-07-03

## 2024-06-19 RX ORDER — ONDANSETRON HYDROCHLORIDE 2 MG/ML
8 INJECTION, SOLUTION INTRAVENOUS
Status: COMPLETED | OUTPATIENT
Start: 2024-06-19 | End: 2024-06-19

## 2024-06-19 RX ORDER — DIPHENHYDRAMINE HYDROCHLORIDE 50 MG/ML
50 INJECTION INTRAMUSCULAR; INTRAVENOUS ONCE AS NEEDED
Status: DISCONTINUED | OUTPATIENT
Start: 2024-06-19 | End: 2024-06-19 | Stop reason: HOSPADM

## 2024-06-19 RX ORDER — EPINEPHRINE 0.3 MG/.3ML
0.3 INJECTION SUBCUTANEOUS ONCE AS NEEDED
Status: DISCONTINUED | OUTPATIENT
Start: 2024-06-19 | End: 2024-06-19 | Stop reason: HOSPADM

## 2024-06-19 RX ORDER — HEPARIN 100 UNIT/ML
500 SYRINGE INTRAVENOUS
OUTPATIENT
Start: 2024-07-03

## 2024-06-19 RX ORDER — MEPERIDINE HYDROCHLORIDE 50 MG/ML
25 INJECTION INTRAMUSCULAR; INTRAVENOUS; SUBCUTANEOUS
Status: DISCONTINUED | OUTPATIENT
Start: 2024-06-19 | End: 2024-06-19 | Stop reason: HOSPADM

## 2024-06-19 RX ORDER — METHYLPREDNISOLONE SOD SUCC 125 MG
100 VIAL (EA) INJECTION
Status: COMPLETED | OUTPATIENT
Start: 2024-06-19 | End: 2024-06-19

## 2024-06-19 RX ORDER — FAMOTIDINE 10 MG/ML
20 INJECTION INTRAVENOUS
Status: COMPLETED | OUTPATIENT
Start: 2024-06-19 | End: 2024-06-19

## 2024-06-19 RX ORDER — EPINEPHRINE 0.3 MG/.3ML
0.3 INJECTION SUBCUTANEOUS ONCE AS NEEDED
OUTPATIENT
Start: 2024-07-03

## 2024-06-19 RX ORDER — METHYLPREDNISOLONE SOD SUCC 125 MG
100 VIAL (EA) INJECTION
OUTPATIENT
Start: 2024-07-03

## 2024-06-19 RX ORDER — ACETAMINOPHEN 325 MG/1
650 TABLET ORAL
OUTPATIENT
Start: 2024-07-03

## 2024-06-19 RX ADMIN — ONDANSETRON 8 MG: 2 INJECTION INTRAMUSCULAR; INTRAVENOUS at 10:06

## 2024-06-19 RX ADMIN — FAMOTIDINE 20 MG: 10 INJECTION INTRAVENOUS at 09:06

## 2024-06-19 RX ADMIN — PROCHLORPERAZINE EDISYLATE 5 MG: 5 INJECTION INTRAMUSCULAR; INTRAVENOUS at 11:06

## 2024-06-19 RX ADMIN — METHYLPREDNISOLONE SODIUM SUCCINATE 100 MG: 125 INJECTION, POWDER, FOR SOLUTION INTRAMUSCULAR; INTRAVENOUS at 09:06

## 2024-06-19 RX ADMIN — SODIUM CHLORIDE 1000 MG: 9 INJECTION, SOLUTION INTRAVENOUS at 09:06

## 2024-06-19 RX ADMIN — DIPHENHYDRAMINE HYDROCHLORIDE 50 MG: 50 INJECTION, SOLUTION INTRAMUSCULAR; INTRAVENOUS at 09:06

## 2024-06-19 NOTE — PLAN OF CARE
1524 Patient reported to clinic for Ruxience #1. Consent signed today. Patient oriented to unit. Verbal and written information provided on patient's treatment. Questions answered. Emotional support provided. Vitals stable before, during, and after infusion. Pre-medicated per orders. Patient declined PO tylenol due to history of liver toxicity.  Patient reported not eating anything yet today. PIV flushed without resistance and positive for blood return before, during and after infusion. Ruxience titrated per orders starting at 50 ml/hr and increased by 50 ml/hr every 30 minutes until max rate. Approx 1 hour 15 min into Ruxience infusion, patient reported mild nausea, Dr ZARINA Koch notified and Zofran 8mg IVP given per orders. Initially patient reported relief from zofran, however about 20 minutes after Zofran when rate was at 200 ml/hr patient began to vomit and report GI discomfort. Infusion paused, NS infusing, and Dr Koch notified. Compazine 5mg IVP given per orders. Waited 20 minutes and okay to restart Ruxience at reduced rate per Dr Koch. Ruxience restarted at 100 ml/hr, and titrated until max rate of 400 ml/hr. Patient tolerated remainder of infusion without incident. Vitals remained stable throughout, no other signs/symptoms of a reaction. Patient verbalized understanding of when to report to emergency room/ seek emergency medical attention. Patient aware of his next appointment date/time, uses [facility]. To contact provider with questions or concerns. D/C ambulatory and stable with his mother.

## 2024-06-25 ENCOUNTER — PATIENT MESSAGE (OUTPATIENT)
Dept: RHEUMATOLOGY | Facility: CLINIC | Age: 24
End: 2024-06-25
Payer: COMMERCIAL

## 2024-06-25 DIAGNOSIS — M34.9 DIFFUSE SYSTEMIC SCLEROSIS: Primary | ICD-10-CM

## 2024-06-26 ENCOUNTER — HOSPITAL ENCOUNTER (EMERGENCY)
Facility: HOSPITAL | Age: 24
Discharge: HOME | End: 2024-06-26
Payer: COMMERCIAL

## 2024-06-26 VITALS
OXYGEN SATURATION: 97 % | SYSTOLIC BLOOD PRESSURE: 123 MMHG | HEIGHT: 72 IN | BODY MASS INDEX: 35.21 KG/M2 | HEART RATE: 83 BPM | DIASTOLIC BLOOD PRESSURE: 64 MMHG | RESPIRATION RATE: 18 BRPM | TEMPERATURE: 98 F | WEIGHT: 260 LBS

## 2024-06-26 DIAGNOSIS — M62.838 MUSCLE SPASM: ICD-10-CM

## 2024-06-26 DIAGNOSIS — S49.91XA RIGHT SHOULDER INJURY, INITIAL ENCOUNTER: Primary | ICD-10-CM

## 2024-06-26 PROCEDURE — 99284 EMERGENCY DEPT VISIT MOD MDM: CPT | Mod: 25

## 2024-06-26 PROCEDURE — 96372 THER/PROPH/DIAG INJ SC/IM: CPT | Performed by: NURSE PRACTITIONER

## 2024-06-26 PROCEDURE — 99284 EMERGENCY DEPT VISIT MOD MDM: CPT | Mod: ,,, | Performed by: NURSE PRACTITIONER

## 2024-06-26 PROCEDURE — 63600175 PHARM REV CODE 636 W HCPCS: Performed by: NURSE PRACTITIONER

## 2024-06-26 RX ORDER — KETOROLAC TROMETHAMINE 30 MG/ML
60 INJECTION, SOLUTION INTRAMUSCULAR; INTRAVENOUS
Status: COMPLETED | OUTPATIENT
Start: 2024-06-26 | End: 2024-06-26

## 2024-06-26 RX ORDER — NAPROXEN 500 MG/1
500 TABLET ORAL 2 TIMES DAILY WITH MEALS
Qty: 20 TABLET | Refills: 0 | Status: SHIPPED | OUTPATIENT
Start: 2024-06-26

## 2024-06-26 RX ORDER — CYCLOBENZAPRINE HCL 10 MG
10 TABLET ORAL 3 TIMES DAILY PRN
Qty: 15 TABLET | Refills: 0 | Status: SHIPPED | OUTPATIENT
Start: 2024-06-26 | End: 2024-07-01

## 2024-06-26 RX ORDER — MYCOPHENOLATE MOFETIL 500 MG/1
1500 TABLET ORAL 2 TIMES DAILY
Qty: 180 TABLET | Refills: 2 | Status: ACTIVE | OUTPATIENT
Start: 2024-06-26

## 2024-06-26 RX ADMIN — KETOROLAC TROMETHAMINE 60 MG: 30 INJECTION, SOLUTION INTRAMUSCULAR at 11:06

## 2024-06-27 NOTE — ED NOTES
DC home with family.   Verbal and written dc instructions given.    Voiced understanding.  Ambulatory on dc

## 2024-06-27 NOTE — ED PROVIDER NOTES
"Encounter Date: 6/26/2024       History     Chief Complaint   Patient presents with    Shoulder Pain     24 y/o AAM with scleroderma, raynaud's and vitiligo arrived to the ED via POV with complaint of right shoulder pain that started yesterday. he was getting up into his truck using his right hand to push on seat when he heard a "pop". Described pain as sharp, radiates from right shoulder to just under right scapula. Tingling into right finger tips at times, but not at present.  Rates pain 9/10. Has been taking Aleve for pain with little relief. Last does of Aleve was this morning.     The history is provided by the patient.     Review of patient's allergies indicates:   Allergen Reactions    Tylenol [acetaminophen]     Acetomenaphthone Other (See Comments)     Elevated liver enzymes when in childhood    Ruxience [rituximab-pvvr] Nausea And Vomiting and Other (See Comments)     Abd pain      Past Medical History:   Diagnosis Date    Raynaud's syndrome without gangrene 03/22/2023    Vitiligo 03/22/2023     Past Surgical History:   Procedure Laterality Date    Knee Scope Left 2017     Family History   Problem Relation Name Age of Onset    No Known Problems Mother      Diabetes Father      Lupus Paternal Cousin       Social History     Tobacco Use    Smoking status: Never     Passive exposure: Never    Smokeless tobacco: Never   Substance Use Topics    Alcohol use: Not Currently    Drug use: Not Currently     Review of Systems   Musculoskeletal:  Positive for arthralgias (right shoulder), back pain and myalgias. Negative for gait problem, neck pain and neck stiffness.   Neurological:  Negative for numbness.   All other systems reviewed and are negative.      Physical Exam     Initial Vitals [06/26/24 2236]   BP Pulse Resp Temp SpO2   112/71 92 20 98.2 °F (36.8 °C) 97 %      MAP       --         Physical Exam    Nursing note and vitals reviewed.  Constitutional: Vital signs are normal. He appears well-developed and " "well-nourished. He is cooperative. He does not appear ill. No distress.   HENT:   Head: Normocephalic.   Cardiovascular:  Normal rate, regular rhythm and normal heart sounds.           Pulses:       Radial pulses are 2+ on the right side.   Pulmonary/Chest: Effort normal and breath sounds normal.   Musculoskeletal:      Right shoulder: Tenderness present. No swelling, deformity or bony tenderness. Decreased range of motion. Normal pulse.      Left shoulder: Normal.      Cervical back: Normal.      Thoracic back: Normal.        Back:       Comments: Right shoulder tender with palpation and ROM. Tender with palpation and ROM over and under right shoulder blade.     Neurological: He is alert and oriented to person, place, and time. He has normal strength. No sensory deficit.   Skin: Skin is warm and dry. Capillary refill takes less than 2 seconds. No rash noted.   Psychiatric: He has a normal mood and affect. His speech is normal and behavior is normal. Judgment normal.         Medical Screening Exam   See Full Note    ED Course   Procedures  Labs Reviewed - No data to display       Imaging Results              X-Ray Shoulder Complete 2 View Right (In process)                      Medications   ketorolac injection 60 mg (60 mg Intramuscular Given 6/26/24 2325)     Medical Decision Making  22 y/o AAM with scleroderma, raynaud's and vitiligo arrived to the ED via POV with complaint of right shoulder pain that started yesterday. he was getting up into his truck using his right hand to push on seat when he heard a "pop". Described pain as sharp, radiates from right shoulder to just under right scapula. Tingling into right finger tips at times, but not at present.  Rates pain 9/10. Has been taking Aleve for pain with little relief. Last does of Aleve was this morning.     Problems Addressed:  Muscle spasm:     Details: -Take Flexeril 10 mg one tab by mouth every 8 hours as needed for muscle spasms. Do not take while driving " due to will cause drowsiness.   -Alternate ice pack with moist heat.  Right shoulder injury, initial encounter:     Details: -Take Naproxen 500 mg one tab by mouth twice a day as needed for pain/inflammation. Do not take Ibuprofen, Motrin, Advil or Aleve while on Naproxen. May take Tylenol 500 mg 2 tabs by mouth every 6 hours in between Naproxen if needed for pain.   -Ice pack 10-15 min, then off 20 min. Alternate with moist heat.  -Schedule follow up with your PCP    Amount and/or Complexity of Data Reviewed  Radiology: ordered.     Details: Right shoulder x-ray: no acute process noted. Formal report pending.  Discussion of management or test interpretation with external provider(s): Discharge MDM  I discussed physical exam findings and x-ray results patient and his mother.    Patient was managed in the ED with:  -Toradol 60 mg IM x 1    The response to treatment was tolerated well. RX for Naproxen and Flexeril sent into pharmacy. Reviewed discharge instructions with patient and his mother. They agreed to treatment plan and verbalized understanding..    Patient was discharged in stable condition.  Detailed return precautions discussed.                                       Clinical Impression:   Final diagnoses:  [S49.91XA] Right shoulder injury, initial encounter (Primary)  [M62.838] Muscle spasm        ED Disposition Condition    Discharge Stable          ED Prescriptions       Medication Sig Dispense Start Date End Date Auth. Provider    naproxen (NAPROSYN) 500 MG tablet Take 1 tablet (500 mg total) by mouth 2 (two) times daily with meals. 20 tablet 6/26/2024 -- Aurelia Winslow FNP    cyclobenzaprine (FLEXERIL) 10 MG tablet Take 1 tablet (10 mg total) by mouth 3 (three) times daily as needed for Muscle spasms. 15 tablet 6/26/2024 7/1/2024 Aurelia Winslow FNP          Follow-up Information       Follow up With Specialties Details Why Contact Info    Primary Care Provider                 Aurelia Winslow  [place], [facility]  06/26/24 8158

## 2024-06-27 NOTE — DISCHARGE INSTRUCTIONS
-Take Naproxen 500 mg one tab by mouth twice a day as needed for pain/inflammation. Do not take Ibuprofen, Motrin, Advil or Aleve while on Naproxen. May take Tylenol 500 mg 2 tabs by mouth every 6 hours in between Naproxen if needed for pain.   -Take Flexeril 10 mg one tab by mouth every 8 hours as needed for muscle spasms. Do not take while driving due to will cause drowsiness.   -Ice pack 10-15 min, then off 20 min. Alternate with moist heat.

## 2024-07-03 ENCOUNTER — INFUSION (OUTPATIENT)
Dept: INFUSION THERAPY | Facility: HOSPITAL | Age: 24
End: 2024-07-03
Payer: COMMERCIAL

## 2024-07-03 VITALS
WEIGHT: 259.94 LBS | HEIGHT: 72 IN | BODY MASS INDEX: 35.21 KG/M2 | DIASTOLIC BLOOD PRESSURE: 67 MMHG | RESPIRATION RATE: 16 BRPM | TEMPERATURE: 98 F | HEART RATE: 75 BPM | SYSTOLIC BLOOD PRESSURE: 124 MMHG

## 2024-07-03 DIAGNOSIS — M34.9 DIFFUSE SYSTEMIC SCLEROSIS: Primary | ICD-10-CM

## 2024-07-03 PROCEDURE — 96367 TX/PROPH/DG ADDL SEQ IV INF: CPT

## 2024-07-03 PROCEDURE — 63600175 PHARM REV CODE 636 W HCPCS: Performed by: INTERNAL MEDICINE

## 2024-07-03 PROCEDURE — 25000003 PHARM REV CODE 250: Performed by: INTERNAL MEDICINE

## 2024-07-03 PROCEDURE — 96415 CHEMO IV INFUSION ADDL HR: CPT

## 2024-07-03 PROCEDURE — 96413 CHEMO IV INFUSION 1 HR: CPT

## 2024-07-03 PROCEDURE — 96375 TX/PRO/DX INJ NEW DRUG ADDON: CPT

## 2024-07-03 RX ORDER — FAMOTIDINE 10 MG/ML
20 INJECTION INTRAVENOUS
Status: CANCELLED | OUTPATIENT
Start: 2024-07-03

## 2024-07-03 RX ORDER — METHYLPREDNISOLONE SOD SUCC 125 MG
100 VIAL (EA) INJECTION
Status: COMPLETED | OUTPATIENT
Start: 2024-07-03 | End: 2024-07-03

## 2024-07-03 RX ORDER — ACETAMINOPHEN 325 MG/1
650 TABLET ORAL
Status: COMPLETED | OUTPATIENT
Start: 2024-07-03 | End: 2024-07-03

## 2024-07-03 RX ORDER — EPINEPHRINE 0.3 MG/.3ML
0.3 INJECTION SUBCUTANEOUS ONCE AS NEEDED
OUTPATIENT
Start: 2024-07-03

## 2024-07-03 RX ORDER — DIPHENHYDRAMINE HYDROCHLORIDE 50 MG/ML
50 INJECTION INTRAMUSCULAR; INTRAVENOUS ONCE AS NEEDED
OUTPATIENT
Start: 2024-07-03

## 2024-07-03 RX ORDER — FAMOTIDINE 10 MG/ML
20 INJECTION INTRAVENOUS
Status: COMPLETED | OUTPATIENT
Start: 2024-07-03 | End: 2024-07-03

## 2024-07-03 RX ORDER — ACETAMINOPHEN 325 MG/1
650 TABLET ORAL
Status: CANCELLED | OUTPATIENT
Start: 2024-07-03

## 2024-07-03 RX ORDER — HEPARIN 100 UNIT/ML
500 SYRINGE INTRAVENOUS
OUTPATIENT
Start: 2024-07-03

## 2024-07-03 RX ORDER — SODIUM CHLORIDE 0.9 % (FLUSH) 0.9 %
10 SYRINGE (ML) INJECTION
OUTPATIENT
Start: 2024-07-03

## 2024-07-03 RX ORDER — MEPERIDINE HYDROCHLORIDE 50 MG/ML
25 INJECTION INTRAMUSCULAR; INTRAVENOUS; SUBCUTANEOUS
OUTPATIENT
Start: 2024-07-03 | End: 2024-07-04

## 2024-07-03 RX ORDER — METHYLPREDNISOLONE SOD SUCC 125 MG
100 VIAL (EA) INJECTION
Status: CANCELLED | OUTPATIENT
Start: 2024-07-03

## 2024-07-03 RX ADMIN — FAMOTIDINE 20 MG: 10 INJECTION INTRAVENOUS at 08:07

## 2024-07-03 RX ADMIN — ACETAMINOPHEN 650 MG: 325 TABLET ORAL at 08:07

## 2024-07-03 RX ADMIN — METHYLPREDNISOLONE SODIUM SUCCINATE 100 MG: 125 INJECTION, POWDER, FOR SOLUTION INTRAMUSCULAR; INTRAVENOUS at 08:07

## 2024-07-03 RX ADMIN — DIPHENHYDRAMINE HYDROCHLORIDE 50 MG: 50 INJECTION, SOLUTION INTRAMUSCULAR; INTRAVENOUS at 08:07

## 2024-07-03 RX ADMIN — SODIUM CHLORIDE 1000 MG: 9 INJECTION, SOLUTION INTRAVENOUS at 11:07

## 2024-07-16 ENCOUNTER — ANESTHESIA EVENT (OUTPATIENT)
Dept: GASTROENTEROLOGY | Facility: HOSPITAL | Age: 24
End: 2024-07-16
Payer: COMMERCIAL

## 2024-07-16 ENCOUNTER — HOSPITAL ENCOUNTER (OUTPATIENT)
Dept: GASTROENTEROLOGY | Facility: HOSPITAL | Age: 24
Discharge: HOME | End: 2024-07-16
Attending: INTERNAL MEDICINE | Admitting: INTERNAL MEDICINE
Payer: COMMERCIAL

## 2024-07-16 ENCOUNTER — ANESTHESIA (OUTPATIENT)
Dept: GASTROENTEROLOGY | Facility: HOSPITAL | Age: 24
End: 2024-07-16
Payer: COMMERCIAL

## 2024-07-16 VITALS
DIASTOLIC BLOOD PRESSURE: 51 MMHG | WEIGHT: 260 LBS | OXYGEN SATURATION: 96 % | SYSTOLIC BLOOD PRESSURE: 111 MMHG | HEART RATE: 78 BPM | TEMPERATURE: 98 F | HEIGHT: 72 IN | RESPIRATION RATE: 17 BRPM | BODY MASS INDEX: 35.21 KG/M2

## 2024-07-16 DIAGNOSIS — K20.80 LOS ANGELES GRADE D ESOPHAGITIS: ICD-10-CM

## 2024-07-16 PROCEDURE — 37000008 HC ANESTHESIA 1ST 15 MINUTES

## 2024-07-16 PROCEDURE — 37000009 HC ANESTHESIA EA ADD 15 MINS

## 2024-07-16 PROCEDURE — D9220A PRA ANESTHESIA: Mod: ,,,

## 2024-07-16 PROCEDURE — C1726 CATH, BAL DIL, NON-VASCULAR: HCPCS

## 2024-07-16 PROCEDURE — 25000003 PHARM REV CODE 250

## 2024-07-16 RX ORDER — SODIUM CHLORIDE 0.9 % (FLUSH) 0.9 %
10 SYRINGE (ML) INJECTION
Status: DISCONTINUED | OUTPATIENT
Start: 2024-07-16 | End: 2024-07-17 | Stop reason: HOSPADM

## 2024-07-16 RX ORDER — PROPOFOL 10 MG/ML
VIAL (ML) INTRAVENOUS
Status: DISCONTINUED | OUTPATIENT
Start: 2024-07-16 | End: 2024-07-16

## 2024-07-16 RX ORDER — LIDOCAINE HYDROCHLORIDE 20 MG/ML
INJECTION, SOLUTION EPIDURAL; INFILTRATION; INTRACAUDAL; PERINEURAL
Status: DISCONTINUED | OUTPATIENT
Start: 2024-07-16 | End: 2024-07-16

## 2024-07-16 RX ORDER — SODIUM CHLORIDE 9 MG/ML
INJECTION, SOLUTION INTRAVENOUS CONTINUOUS PRN
Status: DISCONTINUED | OUTPATIENT
Start: 2024-07-16 | End: 2024-07-16

## 2024-07-16 RX ADMIN — SODIUM CHLORIDE: 9 INJECTION, SOLUTION INTRAVENOUS at 12:07

## 2024-07-16 RX ADMIN — Medication 200 MG: at 12:07

## 2024-07-16 RX ADMIN — Medication 100 MG: at 12:07

## 2024-07-16 RX ADMIN — LIDOCAINE HYDROCHLORIDE 100 MG: 20 INJECTION, SOLUTION INTRAVENOUS at 12:07

## 2024-07-16 RX ADMIN — Medication 150 MG: at 12:07

## 2024-07-16 NOTE — TRANSFER OF CARE
Anesthesia Transfer of Care Note    Patient: Rony Silva    Procedure(s) Performed: EGD    Patient location: GI    Anesthesia Type: general and MAC    Transport from OR: Transported from OR on room air with adequate spontaneous ventilation    Post pain: adequate analgesia    Post assessment: no apparent anesthetic complications    Post vital signs: stable    Level of consciousness: awake, alert and oriented    Nausea/Vomiting: no nausea/vomiting    Complications: none    Transfer of care protocol was followedComments: Refer to nursing note for pain tucker score upon discharge from recovery      Last vitals: Visit Vitals  BP (!) 107/44   Pulse 97   Temp 36.6 °C (97.8 °F)   Resp 20   Ht 6' (1.829 m)   Wt 117.9 kg (260 lb)   SpO2 95%   BMI 35.26 kg/m²

## 2024-07-16 NOTE — ANESTHESIA PREPROCEDURE EVALUATION
07/16/2024  Rony Silva is a 23 y.o., male.      Pre-op Assessment    I have reviewed the Patient Summary Reports.     I have reviewed the Nursing Notes. I have reviewed the NPO Status.   I have reviewed the Medications.     Review of Systems  Anesthesia Hx:  No problems with previous Anesthesia                Hepatic/GI:     GERD             Endocrine:        Obesity / BMI > 30      Physical Exam  General: Well nourished, Cooperative, Alert and Oriented    Airway:  Mallampati: II   Mouth Opening: Normal  TM Distance: Normal  Tongue: Normal  Neck ROM: Normal ROM    Dental:  Intact    Chest/Lungs:  Clear to auscultation, Normal Respiratory Rate    Heart:  Rate: Normal  Rhythm: Regular Rhythm  Sounds: Normal    Abdomen:  Normal, Soft, Nontender        Anesthesia Plan  Type of Anesthesia, risks & benefits discussed:    Anesthesia Type: Gen Natural Airway, MAC  Intra-op Monitoring Plan: Standard ASA Monitors  Post Op Pain Control Plan: multimodal analgesia and IV/PO Opioids PRN  Induction:  IV  Informed Consent: Informed consent signed with the Patient and all parties understand the risks and agree with anesthesia plan.  All questions answered.   ASA Score: 2  Day of Surgery Review of History & Physical: I have interviewed and examined the patient. I have reviewed the patient's H&P dated:     Ready For Surgery From Anesthesia Perspective.     .

## 2024-07-16 NOTE — ANESTHESIA POSTPROCEDURE EVALUATION
Anesthesia Post Evaluation    Patient: Rony Silva    Procedure(s) Performed: EGD    Final Anesthesia Type: MAC      Patient location during evaluation: GI PACU  Patient participation: Yes- Able to Participate  Level of consciousness: awake and alert  Post-procedure vital signs: reviewed and stable  Pain management: adequate  Airway patency: patent    PONV status at discharge: No PONV  Anesthetic complications: no      Cardiovascular status: blood pressure returned to baseline  Respiratory status: unassisted and spontaneous ventilation  Hydration status: euvolemic  Follow-up not needed.  Comments: Refer to nursing note for pain and tucker score upon discharge from recovery              Vitals Value Taken Time   /51 07/16/24 1335   Temp 36.6 °C (97.8 °F) 07/16/24 1301   Pulse 76 07/16/24 1335   Resp 17 07/16/24 1334   SpO2 96 % 07/16/24 1334   Vitals shown include unfiled device data.      No case tracking events are documented in the log.      Pain/Tucker Score: Tucker Score: 10 (7/16/2024  1:24 PM)

## 2024-07-16 NOTE — H&P
Gastroenterology Pre-procedure H&P    History of Present Illness    Rony Silva is a 23 y.o. male that  has a past medical history of Raynaud's syndrome without gangrene (03/22/2023) and Vitiligo (03/22/2023).     Patient with grade D esophagitis and stricture now on PPI here for repeat EGD       Past Medical History:   Diagnosis Date    Raynaud's syndrome without gangrene 03/22/2023    Vitiligo 03/22/2023       Past Surgical History:   Procedure Laterality Date    Knee Scope Left 2017       Family History   Problem Relation Name Age of Onset    No Known Problems Mother      Diabetes Father      Lupus Paternal Cousin         Social History     Socioeconomic History    Marital status: Single   Tobacco Use    Smoking status: Never     Passive exposure: Never    Smokeless tobacco: Never   Substance and Sexual Activity    Alcohol use: Not Currently    Drug use: Not Currently    Sexual activity: Yes     Partners: Female     Birth control/protection: OCP     Social Determinants of Health     Financial Resource Strain: Patient Declined (4/3/2024)    Overall Financial Resource Strain (CARDIA)     Difficulty of Paying Living Expenses: Patient declined   Food Insecurity: Patient Declined (4/3/2024)    Hunger Vital Sign     Worried About Running Out of Food in the Last Year: Patient declined     Ran Out of Food in the Last Year: Patient declined   Transportation Needs: Patient Declined (4/3/2024)    PRAPARE - Transportation     Lack of Transportation (Medical): Patient declined     Lack of Transportation (Non-Medical): Patient declined   Stress: No Stress Concern Present (4/3/2024)    [employer] - Occupational Stress Questionnaire     Feeling of Stress : Not at all   Housing Stability: Low Risk  (4/3/2024)    Housing Stability Vital Sign     Unable to Pay for Housing in the Last Year: No     Number of Places Lived in the Last Year: 1     Unstable Housing in the Last Year: No       Current  Outpatient Medications   Medication Sig Dispense Refill    amLODIPine (NORVASC) 5 MG tablet Take 1 tablet (5 mg total) by mouth once daily. 30 tablet 5    aspirin (ECOTRIN) 81 MG EC tablet Take 81 mg by mouth once daily.      ibuprofen (ADVIL,MOTRIN) 600 MG tablet Take 1 tablet (600 mg total) by mouth every 6 (six) hours as needed for Pain. 20 tablet 0    Lactobacillus rhamnosus GG (CULTURELLE) 10 billion cell capsule Take 1 capsule by mouth once daily.      mycophenolate (CELLCEPT) 500 mg Tab Take 3 tablets (1,500 mg total) by mouth 2 (two) times daily. 180 tablet 2    naproxen (NAPROSYN) 500 MG tablet Take 1 tablet (500 mg total) by mouth 2 (two) times daily with meals. 20 tablet 0    omeprazole (PRILOSEC) 40 MG capsule Take 1 capsule (40 mg total) by mouth 2 (two) times daily before meals. 180 capsule 3     No current facility-administered medications for this encounter.       Review of patient's allergies indicates:   Allergen Reactions    Tylenol [acetaminophen]     Acetomenaphthone Other (See Comments)     Elevated liver enzymes when in childhood    Ruxience [rituximab-pvvr] Nausea And Vomiting and Other (See Comments)     Abd pain        Objective:  There were no vitals filed for this visit.     GEN: normal appearing, NAD, AAO x3  HENT: NCAT, anicteric, OP benign  CV: normal rate, regular rhythm  RESP: CTA, symmetric rise, unlabored  ABD: soft, ND, no guarding or TTP  SKIN: warm and dry  NEURO: grossly afocal    Assessment and Plan:    Proceed with:    EGD for dysphagia, GERD      Kael Rice MD  Gastroenterology

## 2024-07-16 NOTE — DISCHARGE INSTRUCTIONS
Procedure Date  7/16/24     Impression  Overall Impression:   Grade D esophagitis in the middle third of the esophagus, lower third of the esophagus and GE junction; performed cold forceps biopsy  Peptic stricture with length of less than 1 cm and diameter of 13 mm in the GE junction; dilated to 16.5 mm end size. Dilation caused bleeding, mucosal tears and disruption of ring; post-dilation bleeding was minimal; post-dilation mucosal tears were superficial  2 cm type I hiatal hernia  Bezoar in the stomach  The upper third of the esophagus, cardia, body of the stomach, incisura, antrum, duodenal bulb, 1st part of the duodenum and 2nd part of the duodenum appeared normal. Performed random biopsy to rule out celiac disease.        Recommendation  Follow up with me in clinic as scheduled  Given persistent Esophagitis, recommend continuing PPI twice daily indefinitely   Repeat EGD with dilation as needed in the future; no biopsies given prior unremarkable biopsies     Retained food in the stomach concerning for gastroparesis  Avoid opioids and other agents that may delay gastric emptying  Start a gastroparesis diet (low fat, low fiber; you will tolerate liquids and soft foods best); see the URL below for discussion and recommendations for a gastroparesis diet   If symptoms (N/V) worsen, can consider gastric emptying study in clinic      [email or web address]         Outcome of procedure: successful EGD  Disposition: patient to recovery following procedure; discharge to home when appropriate parameters met  Provisions for follow up: please call my office for any unexpected symptoms like chest or abdominal pain or bleeding following your procedure.  Final Diagnosis: GERD    THE NURSE WILL CALL YOU WITH YOUR BIOPSY RESULTS IN A FEW DAYS. IF YOU HAVE  [facility] MYCHART YOUR RESULTS WILL APPEAR THERE AS WELL.    NO DRIVING, OPERATING  EQUIPMENT, OR SIGNING LEGAL DOCUMENTS FOR 24 HOURS.

## 2024-07-22 DIAGNOSIS — I73.00 RAYNAUD'S SYNDROME WITHOUT GANGRENE: ICD-10-CM

## 2024-07-22 RX ORDER — AMLODIPINE BESYLATE 5 MG/1
5 TABLET ORAL DAILY
Qty: 30 TABLET | Refills: 5 | Status: SHIPPED | OUTPATIENT
Start: 2024-07-22 | End: 2025-07-22

## 2024-07-31 ENCOUNTER — PATIENT MESSAGE (OUTPATIENT)
Dept: RHEUMATOLOGY | Facility: CLINIC | Age: 24
End: 2024-07-31
Payer: COMMERCIAL

## 2024-10-11 DIAGNOSIS — K21.9 GASTROESOPHAGEAL REFLUX DISEASE, UNSPECIFIED WHETHER ESOPHAGITIS PRESENT: ICD-10-CM

## 2024-10-12 RX ORDER — OMEPRAZOLE 40 MG/1
40 CAPSULE, DELAYED RELEASE ORAL
Qty: 180 CAPSULE | Refills: 3 | Status: SHIPPED | OUTPATIENT
Start: 2024-10-12 | End: 2025-10-12

## 2024-10-30 DIAGNOSIS — K21.9 GASTROESOPHAGEAL REFLUX DISEASE, UNSPECIFIED WHETHER ESOPHAGITIS PRESENT: ICD-10-CM

## 2024-11-01 RX ORDER — OMEPRAZOLE 40 MG/1
40 CAPSULE, DELAYED RELEASE ORAL
Qty: 180 CAPSULE | Refills: 3 | Status: SHIPPED | OUTPATIENT
Start: 2024-11-01 | End: 2025-11-01

## 2024-11-05 ENCOUNTER — PATIENT MESSAGE (OUTPATIENT)
Dept: RESEARCH | Facility: HOSPITAL | Age: 24
End: 2024-11-05
Payer: COMMERCIAL

## 2024-11-05 DIAGNOSIS — I73.00 RAYNAUD'S SYNDROME WITHOUT GANGRENE: ICD-10-CM

## 2024-11-05 RX ORDER — AMLODIPINE BESYLATE 5 MG/1
5 TABLET ORAL DAILY
Qty: 30 TABLET | Refills: 2 | Status: SHIPPED | OUTPATIENT
Start: 2024-11-05 | End: 2025-02-03

## 2024-11-15 ENCOUNTER — LAB VISIT (OUTPATIENT)
Dept: LAB | Facility: CLINIC | Age: 24
End: 2024-11-15
Payer: COMMERCIAL

## 2024-11-15 DIAGNOSIS — M34.9 DIFFUSE SYSTEMIC SCLEROSIS: ICD-10-CM

## 2024-11-15 DIAGNOSIS — Z79.899 HIGH RISK MEDICATION USE: ICD-10-CM

## 2024-11-15 PROCEDURE — 36415 COLL VENOUS BLD VENIPUNCTURE: CPT

## 2024-11-25 ENCOUNTER — OFFICE VISIT (OUTPATIENT)
Dept: RHEUMATOLOGY | Facility: CLINIC | Age: 24
End: 2024-11-25
Payer: COMMERCIAL

## 2024-11-25 ENCOUNTER — LAB VISIT (OUTPATIENT)
Dept: LAB | Facility: HOSPITAL | Age: 24
End: 2024-11-25
Attending: INTERNAL MEDICINE
Payer: COMMERCIAL

## 2024-11-25 ENCOUNTER — PATIENT MESSAGE (OUTPATIENT)
Dept: RHEUMATOLOGY | Facility: CLINIC | Age: 24
End: 2024-11-25

## 2024-11-25 VITALS
BODY MASS INDEX: 35.83 KG/M2 | HEIGHT: 72 IN | SYSTOLIC BLOOD PRESSURE: 115 MMHG | WEIGHT: 264.56 LBS | HEART RATE: 87 BPM | DIASTOLIC BLOOD PRESSURE: 66 MMHG

## 2024-11-25 DIAGNOSIS — Z79.899 HIGH RISK MEDICATION USE: ICD-10-CM

## 2024-11-25 DIAGNOSIS — E66.01 CLASS 2 SEVERE OBESITY DUE TO EXCESS CALORIES WITH SERIOUS COMORBIDITY AND BODY MASS INDEX (BMI) OF 35.0 TO 35.9 IN ADULT: ICD-10-CM

## 2024-11-25 DIAGNOSIS — M34.9 DIFFUSE SYSTEMIC SCLEROSIS: Primary | ICD-10-CM

## 2024-11-25 DIAGNOSIS — E66.812 CLASS 2 SEVERE OBESITY DUE TO EXCESS CALORIES WITH SERIOUS COMORBIDITY AND BODY MASS INDEX (BMI) OF 35.0 TO 35.9 IN ADULT: ICD-10-CM
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
## 2024-11-25 DIAGNOSIS — M34.9 DIFFUSE SYSTEMIC SCLEROSIS: ICD-10-CM

## 2024-11-25 DIAGNOSIS — I73.01 RAYNAUD'S PHENOMENON WITH GANGRENE: ICD-10-CM

## 2024-11-25 PROBLEM — L02.211 ABDOMINAL WALL ABSCESS: Status: RESOLVED | Noted: 2023-12-03 | Resolved: 2024-11-25

## 2024-11-25 LAB
ALBUMIN SERPL BCP-MCNC: 4.2 G/DL (ref 3.5–5.2)
ALP SERPL-CCNC: 82 U/L (ref 40–150)
ALT SERPL W/O P-5'-P-CCNC: 23 U/L (ref 10–44)
ANION GAP SERPL CALC-SCNC: 10 MMOL/L (ref 8–16)
AST SERPL-CCNC: 20 U/L (ref 10–40)
BASOPHILS # BLD AUTO: 0.09 K/UL (ref 0–0.2)
BASOPHILS NFR BLD: 0.8 % (ref 0–1.9)
BILIRUB SERPL-MCNC: 0.5 MG/DL (ref 0.1–1)
BUN SERPL-MCNC: 7 MG/DL (ref 6–20)
CALCIUM SERPL-MCNC: 9.9 MG/DL (ref 8.7–10.5)
CHLORIDE SERPL-SCNC: 103 MMOL/L (ref 95–110)
CK SERPL-CCNC: 148 U/L (ref 20–200)
CO2 SERPL-SCNC: 27 MMOL/L (ref 23–29)
CREAT SERPL-MCNC: 0.6 MG/DL (ref 0.5–1.4)
CRP SERPL-MCNC: 5.3 MG/L (ref 0–8.2)
DIFFERENTIAL METHOD BLD: ABNORMAL
EOSINOPHIL # BLD AUTO: 0.2 K/UL (ref 0–0.5)
EOSINOPHIL NFR BLD: 1.3 % (ref 0–8)
ERYTHROCYTE [DISTWIDTH] IN BLOOD BY AUTOMATED COUNT: 14.5 % (ref 11.5–14.5)
ERYTHROCYTE [SEDIMENTATION RATE] IN BLOOD BY PHOTOMETRIC METHOD: 8 MM/HR (ref 0–23)
EST. GFR  (NO RACE VARIABLE): >60 ML/MIN/1.73 M^2
GLUCOSE SERPL-MCNC: 79 MG/DL (ref 70–110)
HCT VFR BLD AUTO: 46.9 % (ref 40–54)
HGB BLD-MCNC: 14 G/DL (ref 14–18)
IGA SERPL-MCNC: 383 MG/DL (ref 40–350)
IGG SERPL-MCNC: 1426 MG/DL (ref 650–1600)
IGM SERPL-MCNC: 66 MG/DL (ref 50–300)
IMM GRANULOCYTES # BLD AUTO: 0.04 K/UL (ref 0–0.04)
IMM GRANULOCYTES NFR BLD AUTO: 0.3 % (ref 0–0.5)
LYMPHOCYTES # BLD AUTO: 2 K/UL (ref 1–4.8)
LYMPHOCYTES NFR BLD: 17 % (ref 18–48)
MCH RBC QN AUTO: 25 PG (ref 27–31)
MCHC RBC AUTO-ENTMCNC: 29.9 G/DL (ref 32–36)
MCV RBC AUTO: 84 FL (ref 82–98)
MONOCYTES # BLD AUTO: 1.4 K/UL (ref 0.3–1)
MONOCYTES NFR BLD: 11.8 % (ref 4–15)
NEUTROPHILS # BLD AUTO: 8.1 K/UL (ref 1.8–7.7)
NEUTROPHILS NFR BLD: 68.8 % (ref 38–73)
NRBC BLD-RTO: 0 /100 WBC
PLATELET # BLD AUTO: 442 K/UL (ref 150–450)
PMV BLD AUTO: 10.4 FL (ref 9.2–12.9)
POTASSIUM SERPL-SCNC: 4.6 MMOL/L (ref 3.5–5.1)
PROT SERPL-MCNC: 8 G/DL (ref 6–8.4)
RBC # BLD AUTO: 5.61 M/UL (ref 4.6–6.2)
SODIUM SERPL-SCNC: 140 MMOL/L (ref 136–145)
WBC # BLD AUTO: 11.77 K/UL (ref 3.9–12.7)

## 2024-11-25 PROCEDURE — 88185 FLOWCYTOMETRY/TC ADD-ON: CPT | Mod: 59 | Performed by: INTERNAL MEDICINE

## 2024-11-25 PROCEDURE — 1160F RVW MEDS BY RX/DR IN RCRD: CPT | Mod: CPTII,S$GLB,, | Performed by: INTERNAL MEDICINE

## 2024-11-25 PROCEDURE — 82784 ASSAY IGA/IGD/IGG/IGM EACH: CPT | Mod: 59 | Performed by: INTERNAL MEDICINE

## 2024-11-25 PROCEDURE — 85652 RBC SED RATE AUTOMATED: CPT | Performed by: INTERNAL MEDICINE

## 2024-11-25 PROCEDURE — 86140 C-REACTIVE PROTEIN: CPT | Performed by: INTERNAL MEDICINE

## 2024-11-25 PROCEDURE — 3078F DIAST BP <80 MM HG: CPT | Mod: CPTII,S$GLB,, | Performed by: INTERNAL MEDICINE

## 2024-11-25 PROCEDURE — 99999 PR PBB SHADOW E&M-EST. PATIENT-LVL III: CPT | Mod: PBBFAC,,, | Performed by: INTERNAL MEDICINE

## 2024-11-25 PROCEDURE — 80053 COMPREHEN METABOLIC PANEL: CPT | Performed by: INTERNAL MEDICINE

## 2024-11-25 PROCEDURE — 82550 ASSAY OF CK (CPK): CPT | Performed by: INTERNAL MEDICINE

## 2024-11-25 PROCEDURE — 99214 OFFICE O/P EST MOD 30 MIN: CPT | Mod: S$GLB,,, | Performed by: INTERNAL MEDICINE

## 2024-11-25 PROCEDURE — 1159F MED LIST DOCD IN RCRD: CPT | Mod: CPTII,S$GLB,, | Performed by: INTERNAL MEDICINE

## 2024-11-25 PROCEDURE — 3008F BODY MASS INDEX DOCD: CPT | Mod: CPTII,S$GLB,, | Performed by: INTERNAL MEDICINE

## 2024-11-25 PROCEDURE — 3074F SYST BP LT 130 MM HG: CPT | Mod: CPTII,S$GLB,, | Performed by: INTERNAL MEDICINE

## 2024-11-25 PROCEDURE — 85025 COMPLETE CBC W/AUTO DIFF WBC: CPT | Performed by: INTERNAL MEDICINE

## 2024-11-25 RX ORDER — MYCOPHENOLATE MOFETIL 500 MG/1
1500 TABLET ORAL 2 TIMES DAILY
Qty: 180 TABLET | Refills: 2 | Status: SHIPPED | OUTPATIENT
Start: 2024-11-25

## 2024-11-25 ASSESSMENT — ROUTINE ASSESSMENT OF PATIENT INDEX DATA (RAPID3)
TOTAL RAPID3 SCORE: 4.61
WHEN YOU AWAKENED IN THE MORNING OVER THE LAST WEEK, PLEASE INDICATE THE AMOUNT OF TIME IT TAKES UNTIL YOU ARE AS LIMBER AS YOU WILL BE FOR THE DAY: 1
AM STIFFNESS SCORE: 1, YES
PATIENT GLOBAL ASSESSMENT SCORE: 4.5
PSYCHOLOGICAL DISTRESS SCORE: 2.2
MDHAQ FUNCTION SCORE: 1
PAIN SCORE: 6
FATIGUE SCORE: 0
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
## 2024-11-25 NOTE — ASSESSMENT & PLAN NOTE
He is now s/p rituximab July 2024 and is continuing MMF  and reports no progression of skin or GI or any new pulmonary/cardiac symptoms or digital lesions    mRSS today 17    Will get lab today  Will continue mycophenolate mofetil   Will plan lab in 3 mo   Return To Clinic me April with PFT and echo

## 2024-11-25 NOTE — PROGRESS NOTES
11/24/2024     1:42 PM   Rapid3 Question Responses and Scores   MDHAQ Score 1   Psychologic Score 2.2   Pain Score 6   When you awakened in the morning OVER THE LAST WEEK, did you feel stiff? Yes   If Yes, please indicate the number of hours until you are as limber as you will be for the day 1   Fatigue Score 0   Global Health Score 4.5   RAPID3 Score 4.61        Answers submitted by the patient for this visit:  Rheumatology Questionnaire (Submitted on 11/24/2024)  fever: No  eye redness: No  mouth sores: No  headaches: No  shortness of breath: No  chest pain: No  trouble swallowing: No  diarrhea: No  constipation: No  unexpected weight change: No  genital sore: No  During the last 3 days, have you had a skin rash?: No  Bruises or bleeds easily: No  cough: No

## 2024-11-27 LAB
PATH REPORT.FINAL DX SPEC: NORMAL
RITUXAN SENSITIVITY (CD20): NORMAL

## 2024-12-18 DIAGNOSIS — M34.9 DIFFUSE SYSTEMIC SCLEROSIS: ICD-10-CM

## 2024-12-20 RX ORDER — MYCOPHENOLATE MOFETIL 500 MG/1
1500 TABLET ORAL 2 TIMES DAILY
Qty: 180 TABLET | Refills: 0 | OUTPATIENT
Start: 2024-12-20

## 2025-01-25 ENCOUNTER — PATIENT MESSAGE (OUTPATIENT)
Dept: ADMINISTRATIVE | Facility: OTHER | Age: 25
End: 2025-01-25
Payer: COMMERCIAL

## 2025-04-08 DIAGNOSIS — I73.00 RAYNAUD'S SYNDROME WITHOUT GANGRENE: ICD-10-CM

## 2025-04-09 RX ORDER — AMLODIPINE BESYLATE 5 MG/1
5 TABLET ORAL DAILY
Qty: 30 TABLET | Refills: 2 | Status: SHIPPED | OUTPATIENT
Start: 2025-04-09 | End: 2025-07-08

## 2025-04-11 RX ORDER — MYCOPHENOLATE MOFETIL 500 MG/1
1500 TABLET ORAL 2 TIMES DAILY
Qty: 180 TABLET | Refills: 2 | OUTPATIENT
Start: 2025-04-11

## 2025-04-17 ENCOUNTER — LAB VISIT (OUTPATIENT)
Dept: LAB | Facility: CLINIC | Age: 25
End: 2025-04-17
Payer: COMMERCIAL

## 2025-04-17 DIAGNOSIS — M34.9 DIFFUSE SYSTEMIC SCLEROSIS: ICD-10-CM

## 2025-04-17 LAB
ALBUMIN SERPL BCP-MCNC: 4.3 G/DL (ref 3.5–5)
ALBUMIN/GLOB SERPL: 1.3 {RATIO}
ALP SERPL-CCNC: 85 U/L (ref 40–150)
ALT SERPL W P-5'-P-CCNC: 25 U/L
AST SERPL W P-5'-P-CCNC: 20 U/L (ref 11–45)
BASOPHILS # BLD AUTO: 0.07 K/UL (ref 0–0.2)
BASOPHILS NFR BLD AUTO: 0.7 % (ref 0–1)
BILIRUB SERPL-MCNC: 0.6 MG/DL
BUN SERPL-MCNC: 6 MG/DL (ref 9–21)
BUN/CREAT SERPL: 11 (ref 6–20)
CALCIUM SERPL-MCNC: 9.3 MG/DL (ref 8.4–10.2)
CHLORIDE SERPL-SCNC: 103 MMOL/L (ref 98–107)
CK SERPL-CCNC: 195 U/L (ref 30–200)
CO2 SERPL-SCNC: 25 MMOL/L (ref 22–29)
CREAT SERPL-MCNC: 0.56 MG/DL (ref 0.72–1.25)
CRP SERPL HS-MCNC: 7.38 MG/L
DIFFERENTIAL METHOD BLD: ABNORMAL
EGFR (NO RACE VARIABLE) (RUSH/TITUS): 142 ML/MIN/1.73M2
EOSINOPHIL # BLD AUTO: 0.14 K/UL (ref 0–0.5)
EOSINOPHIL NFR BLD AUTO: 1.3 % (ref 1–4)
ERYTHROCYTE [DISTWIDTH] IN BLOOD BY AUTOMATED COUNT: 14 % (ref 11.5–14.5)
ERYTHROCYTE [SEDIMENTATION RATE] IN BLOOD BY WESTERGREN METHOD: 2 MM/HR (ref 0–15)
GLOBULIN SER-MCNC: 3.4 G/DL (ref 2–4)
GLUCOSE SERPL-MCNC: 89 MG/DL (ref 74–100)
HCT VFR BLD AUTO: 45.6 % (ref 40–54)
HGB BLD-MCNC: 13.8 G/DL (ref 13.5–18)
IMM GRANULOCYTES # BLD AUTO: 0.04 K/UL (ref 0–0.04)
IMM GRANULOCYTES NFR BLD: 0.4 % (ref 0–0.4)
LYMPHOCYTES # BLD AUTO: 1.85 K/UL (ref 1–4.8)
LYMPHOCYTES NFR BLD AUTO: 17.5 % (ref 27–41)
MCH RBC QN AUTO: 25.2 PG (ref 27–31)
MCHC RBC AUTO-ENTMCNC: 30.3 G/DL (ref 32–36)
MCV RBC AUTO: 83.2 FL (ref 80–96)
MONOCYTES # BLD AUTO: 1.01 K/UL (ref 0–0.8)
MONOCYTES NFR BLD AUTO: 9.5 % (ref 2–6)
MPC BLD CALC-MCNC: 10.6 FL (ref 9.4–12.4)
NEUTROPHILS # BLD AUTO: 7.49 K/UL (ref 1.8–7.7)
NEUTROPHILS NFR BLD AUTO: 70.6 % (ref 53–65)
NRBC # BLD AUTO: 0 X10E3/UL
NRBC, AUTO (.00): 0 %
PLATELET # BLD AUTO: 431 K/UL (ref 150–400)
POTASSIUM SERPL-SCNC: 3.9 MMOL/L (ref 3.5–5.1)
PROT SERPL-MCNC: 7.7 G/DL (ref 6.4–8.3)
RBC # BLD AUTO: 5.48 M/UL (ref 4.6–6.2)
SODIUM SERPL-SCNC: 141 MMOL/L (ref 136–145)
WBC # BLD AUTO: 10.6 K/UL (ref 4.5–11)

## 2025-04-17 PROCEDURE — 85651 RBC SED RATE NONAUTOMATED: CPT | Mod: ,,, | Performed by: CLINICAL MEDICAL LABORATORY

## 2025-04-17 PROCEDURE — 80053 COMPREHEN METABOLIC PANEL: CPT | Mod: ,,, | Performed by: CLINICAL MEDICAL LABORATORY

## 2025-04-17 PROCEDURE — 86141 C-REACTIVE PROTEIN HS: CPT | Mod: ,,, | Performed by: CLINICAL MEDICAL LABORATORY

## 2025-04-17 PROCEDURE — 82550 ASSAY OF CK (CPK): CPT | Mod: ,,, | Performed by: CLINICAL MEDICAL LABORATORY

## 2025-04-17 PROCEDURE — 85025 COMPLETE CBC W/AUTO DIFF WBC: CPT | Mod: ,,, | Performed by: CLINICAL MEDICAL LABORATORY

## 2025-04-21 DIAGNOSIS — M34.9 DIFFUSE SYSTEMIC SCLEROSIS: Primary | ICD-10-CM

## 2025-04-21 RX ORDER — MYCOPHENOLATE MOFETIL 500 MG/1
1500 TABLET ORAL 2 TIMES DAILY
Qty: 180 TABLET | Refills: 2 | Status: ACTIVE | OUTPATIENT
Start: 2025-04-21

## 2025-04-22 ENCOUNTER — RESULTS FOLLOW-UP (OUTPATIENT)
Dept: RHEUMATOLOGY | Facility: CLINIC | Age: 25
End: 2025-04-22

## 2025-05-01 ENCOUNTER — OFFICE VISIT (OUTPATIENT)
Dept: FAMILY MEDICINE | Facility: CLINIC | Age: 25
End: 2025-05-01
Payer: COMMERCIAL

## 2025-05-01 VITALS
RESPIRATION RATE: 18 BRPM | DIASTOLIC BLOOD PRESSURE: 74 MMHG | SYSTOLIC BLOOD PRESSURE: 110 MMHG | WEIGHT: 279 LBS | BODY MASS INDEX: 37.79 KG/M2 | HEIGHT: 72 IN | HEART RATE: 85 BPM | TEMPERATURE: 98 F

## 2025-05-01 DIAGNOSIS — E66.01 CLASS 2 SEVERE OBESITY DUE TO EXCESS CALORIES WITH SERIOUS COMORBIDITY AND BODY MASS INDEX (BMI) OF 37.0 TO 37.9 IN ADULT: ICD-10-CM

## 2025-05-01 DIAGNOSIS — E66.812 CLASS 2 SEVERE OBESITY DUE TO EXCESS CALORIES WITH SERIOUS COMORBIDITY AND BODY MASS INDEX (BMI) OF 37.0 TO 37.9 IN ADULT: ICD-10-CM

## 2025-05-01 DIAGNOSIS — Z13.1 SCREENING FOR DIABETES MELLITUS: ICD-10-CM

## 2025-05-01 DIAGNOSIS — M34.9 DIFFUSE SYSTEMIC SCLEROSIS: Primary | ICD-10-CM

## 2025-05-01 DIAGNOSIS — K21.9 GASTROESOPHAGEAL REFLUX DISEASE WITHOUT ESOPHAGITIS: ICD-10-CM
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
## 2025-05-01 NOTE — ASSESSMENT & PLAN NOTE
Patient's only symptom is occasional difficulty with swallowing.  He does have to have esophageal dilatation at least yearly.  Recent labs show [place] [address] hemoglobin 13.8 hematocrit 45.6 with platelets of 431 1000 electrolytes are normal with a BUN of 0.56 his CRP was elevated at 7.38 in his sed rate was 2.  CPK was 195.  Adequate control.  No changes in medicines.  Follow-up with Rheumatology this month.  Will check an A1c on him today as he is at risk due to his family history in his obesity

## 2025-05-01 NOTE — ASSESSMENT & PLAN NOTE
Discussed patient's weight today.  Did discuss at length the low carbohydrate diet.  Also discussed exercising with the patient if he can 5 days a week for 30 minutes at a time.  Will consider weight loss medicines in him especially if he continues to gain weight or if he develops diabetes.

## 2025-05-01 NOTE — ASSESSMENT & PLAN NOTE
Patient has GERD is been well controlled with the omeprazole twice a day daily.  He does have occasional difficulty with swallowing and has to have his throat stretched at least yearly.  Follow-up with GI if he is having difficulties.  No change in medicines.  Follow-up with us every 6 months

## 2025-05-15 ENCOUNTER — OFFICE VISIT (OUTPATIENT)
Dept: RHEUMATOLOGY | Facility: CLINIC | Age: 25
End: 2025-05-15
Payer: COMMERCIAL

## 2025-05-15 VITALS
DIASTOLIC BLOOD PRESSURE: 69 MMHG | BODY MASS INDEX: 37.43 KG/M2 | WEIGHT: 276 LBS | HEART RATE: 83 BPM | SYSTOLIC BLOOD PRESSURE: 105 MMHG

## 2025-05-15 DIAGNOSIS — M34.9 DIFFUSE SYSTEMIC SCLEROSIS: Primary | ICD-10-CM

## 2025-05-15 DIAGNOSIS — K22.4 ESOPHAGEAL DYSMOTILITY DUE TO SYSTEMIC DISEASE: ICD-10-CM

## 2025-05-15 DIAGNOSIS — I73.00 RAYNAUD'S SYNDROME WITHOUT GANGRENE: ICD-10-CM

## 2025-05-15 PROCEDURE — 99999 PR PBB SHADOW E&M-EST. PATIENT-LVL III: CPT | Mod: PBBFAC,,, | Performed by: STUDENT IN AN ORGANIZED HEALTH CARE EDUCATION/TRAINING PROGRAM

## 2025-05-15 PROCEDURE — 3008F BODY MASS INDEX DOCD: CPT | Mod: CPTII,S$GLB,, | Performed by: STUDENT IN AN ORGANIZED HEALTH CARE EDUCATION/TRAINING PROGRAM

## 2025-05-15 PROCEDURE — 3074F SYST BP LT 130 MM HG: CPT | Mod: CPTII,S$GLB,, | Performed by: STUDENT IN AN ORGANIZED HEALTH CARE EDUCATION/TRAINING PROGRAM

## 2025-05-15 PROCEDURE — 99214 OFFICE O/P EST MOD 30 MIN: CPT | Mod: S$GLB,,, | Performed by: STUDENT IN AN ORGANIZED HEALTH CARE EDUCATION/TRAINING PROGRAM

## 2025-05-15 PROCEDURE — 3078F DIAST BP <80 MM HG: CPT | Mod: CPTII,S$GLB,, | Performed by: STUDENT IN AN ORGANIZED HEALTH CARE EDUCATION/TRAINING PROGRAM

## 2025-05-15 PROCEDURE — 1159F MED LIST DOCD IN RCRD: CPT | Mod: CPTII,S$GLB,, | Performed by: STUDENT IN AN ORGANIZED HEALTH CARE EDUCATION/TRAINING PROGRAM

## 2025-05-15 RX ORDER — AMLODIPINE BESYLATE 2.5 MG/1
7.5 TABLET ORAL DAILY
Qty: 30 TABLET | Refills: 2 | Status: SHIPPED | OUTPATIENT
Start: 2025-05-15

## 2025-05-15 NOTE — PROGRESS NOTES
5/15/2025    11:08 AM   Rapid3 Question Responses and Scores   MDHAQ Score 1   Psychologic Score 1.1   Pain Score 3.5   When you awakened in the morning OVER THE LAST WEEK, did you feel stiff? Yes   If Yes, please indicate the number of hours until you are as limber as you will be for the day 2   Fatigue Score 0.5   Global Health Score 4   RAPID3 Score 3.61     Answers submitted by the patient for this visit:  Rheumatology Questionnaire (Submitted on 5/15/2025)  fever: No  eye redness: No  mouth sores: No  headaches: No  shortness of breath: No  chest pain: No  trouble swallowing: Yes  diarrhea: No  constipation: No  unexpected weight change: No  genital sore: No  During the last 3 days, have you had a skin rash?: No  Bruises or bleeds easily: No  cough: No

## 2025-05-15 NOTE — PROGRESS NOTES
Subjective:      Patient ID: Rony Silva is a 24 y.o. male.    Chief Complaint: Disease Management            Associated symptoms include trouble swallowing. Pertinent negatives include no fever or headaches.           Shoulders are bothering him and tightening up. Feels like skin is about the same. No new medical changes since he was last here. Swallowing difficulty that improved after EGD.     Never been on steroids.     No cough, trouble breathing. No kidney issues. Started getting skin tightening in 2022. Beginning of 2023 was diagnosed. Has been on cellcept since diagnosis.     Raynaud's - has been fine. Hasn't been having sores but had them in the past. Can be daily.         Review of Systems   Constitutional:  Negative for fever and unexpected weight change.   HENT:  Positive for trouble swallowing. Negative for mouth sores.    Eyes:  Negative for redness.   Respiratory:  Negative for cough and shortness of breath.    Cardiovascular:  Negative for chest pain.   Gastrointestinal:  Negative for constipation and diarrhea.   Genitourinary:  Negative for genital sores.   Skin:  Negative for rash.   Neurological:  Negative for headaches.   Hematological:  Does not bruise/bleed easily.        Objective:   /69   Pulse 83   Wt 125.2 kg (276 lb 0.3 oz)   BMI 37.43 kg/m²   Physical Exam   Constitutional: He is oriented to person, place, and time. No distress.   HENT:   Head: Normocephalic and atraumatic.   Cardiovascular: Normal rate, regular rhythm and normal heart sounds.   Pulmonary/Chest: Effort normal and breath sounds normal. No respiratory distress.   Abdominal: Soft.   Musculoskeletal:         General: No tenderness. Normal range of motion.   Neurological: He is alert and oriented to person, place, and time.   Skin: Skin is warm and dry. No rash noted.   mRSS 19. Sparing hands/feet. Worst thickening of distal forearms and legs. Mild of face/chest. Moderate of abdomen.    Psychiatric: His behavior is  normal. Judgment and thought content normal.       No data to display     Assessment/Plan:     1. Raynaud's syndrome without gangrene        Systemic Sclerosis  Raynaud's  Diagnosis/Important Hx: Diagnosed in 2022/2023 with symptoms including skin involvement, dysphagia and raynaud's. No other major organ involvement. Negative serologies. mRSS 19 5/16/25.  Relevant serologies: Negative  Previous Therapy: Rituximab (last dose 7/2023)  Current Therapy: MMF 1500mg BID, amlodipine 5mg daily  Imaging/Procedures:   7/2024   EGD - Grade D esophagitis and peptic stricture needing dilation  4/2024   HRCT chest - No interstitial lung disease process of significance is identified. There is no bronchiectasis. Central airway is clear. Exam is otherwise generally unremarkable   Other notes: Will likely be enrolling in KERRY trial  PLAN  - Increase amlodipine to 7.5mg daily  - Continue MMF 1500mg BID  - Discussed enrollment in KERRY clinical trial for anifrolumab in [facility]  - F/u in 4-6 weeks if pt amenable to trial  - Otherwise f/u in 3 mo    Problem List Items Addressed This Visit    None  Visit Diagnoses         Raynaud's syndrome without gangrene        Relevant Medications    amLODIPine (NORVASC) 2.5 MG tablet          This patient encounter was staffed and the plan was formulated with rheumatology attending Dr. Koch.    Jackie Cedillo MD  Rheumatology Fellow, PGY-4

## 2025-05-16 NOTE — PROGRESS NOTES
I have personally taken the history and examined the patient to verify the fellow's notation, and I agree with the impression and plan stated.      As Dr Cedillo notes, he meets criteria for screening for the KERRY anifrolumab in systemic sclerosis trial so we have given him the consent form to review and will schedule screening procedures to occur within a 6 week period.

## 2025-05-23 ENCOUNTER — PATIENT MESSAGE (OUTPATIENT)
Dept: RHEUMATOLOGY | Facility: CLINIC | Age: 25
End: 2025-05-23
Payer: COMMERCIAL

## 2025-05-29 ENCOUNTER — PATIENT MESSAGE (OUTPATIENT)
Dept: RHEUMATOLOGY | Facility: CLINIC | Age: 25
End: 2025-05-29
Payer: COMMERCIAL

## 2025-06-03 ENCOUNTER — HOSPITAL ENCOUNTER (OUTPATIENT)
Dept: PULMONOLOGY | Facility: CLINIC | Age: 25
Discharge: HOME | End: 2025-06-03
Payer: COMMERCIAL

## 2025-06-03 ENCOUNTER — TELEPHONE (OUTPATIENT)
Dept: FAMILY MEDICINE | Facility: CLINIC | Age: 25
End: 2025-06-03
Payer: COMMERCIAL

## 2025-06-03 ENCOUNTER — LAB VISIT (OUTPATIENT)
Dept: LAB | Facility: HOSPITAL | Age: 25
End: 2025-06-03
Payer: COMMERCIAL

## 2025-06-03 ENCOUNTER — OFFICE VISIT (OUTPATIENT)
Dept: RHEUMATOLOGY | Facility: CLINIC | Age: 25
End: 2025-06-03
Payer: COMMERCIAL

## 2025-06-03 VITALS
SYSTOLIC BLOOD PRESSURE: 89 MMHG | HEART RATE: 87 BPM | WEIGHT: 282.19 LBS | DIASTOLIC BLOOD PRESSURE: 57 MMHG | BODY MASS INDEX: 38.22 KG/M2 | HEIGHT: 72 IN

## 2025-06-03 DIAGNOSIS — I73.01 RAYNAUD'S PHENOMENON WITH GANGRENE: Primary | ICD-10-CM

## 2025-06-03 DIAGNOSIS — I73.01 RAYNAUD'S PHENOMENON WITH GANGRENE: ICD-10-CM

## 2025-06-03 DIAGNOSIS — M34.9 DIFFUSE SYSTEMIC SCLEROSIS: ICD-10-CM

## 2025-06-03 DIAGNOSIS — Z79.899 HIGH RISK MEDICATION USE: ICD-10-CM

## 2025-06-03 DIAGNOSIS — J84.9 INTERSTITIAL PULMONARY DISEASE, UNSPECIFIED: ICD-10-CM

## 2025-06-03 DIAGNOSIS — M34.9 DIFFUSE SYSTEMIC SCLEROSIS: Primary | ICD-10-CM

## 2025-06-03 LAB — ERYTHROCYTE [SEDIMENTATION RATE] IN BLOOD BY PHOTOMETRIC METHOD: 14 MM/HR

## 2025-06-03 PROCEDURE — 1159F MED LIST DOCD IN RCRD: CPT | Mod: CPTII,S$GLB,, | Performed by: INTERNAL MEDICINE

## 2025-06-03 PROCEDURE — 3008F BODY MASS INDEX DOCD: CPT | Mod: CPTII,S$GLB,, | Performed by: INTERNAL MEDICINE

## 2025-06-03 PROCEDURE — 36415 COLL VENOUS BLD VENIPUNCTURE: CPT

## 2025-06-03 PROCEDURE — 1160F RVW MEDS BY RX/DR IN RCRD: CPT | Mod: CPTII,S$GLB,, | Performed by: INTERNAL MEDICINE

## 2025-06-03 PROCEDURE — 99999 PR PBB SHADOW E&M-EST. PATIENT-LVL III: CPT | Mod: PBBFAC,,, | Performed by: INTERNAL MEDICINE

## 2025-06-03 PROCEDURE — 3074F SYST BP LT 130 MM HG: CPT | Mod: CPTII,S$GLB,, | Performed by: INTERNAL MEDICINE

## 2025-06-03 PROCEDURE — 99214 OFFICE O/P EST MOD 30 MIN: CPT | Mod: S$GLB,,, | Performed by: INTERNAL MEDICINE

## 2025-06-03 PROCEDURE — 3078F DIAST BP <80 MM HG: CPT | Mod: CPTII,S$GLB,, | Performed by: INTERNAL MEDICINE

## 2025-06-03 RX ORDER — AMLODIPINE BESYLATE 5 MG/1
5 TABLET ORAL DAILY
COMMUNITY
Start: 2025-06-01

## 2025-06-03 ASSESSMENT — ROUTINE ASSESSMENT OF PATIENT INDEX DATA (RAPID3)
WHEN YOU AWAKENED IN THE MORNING OVER THE LAST WEEK, PLEASE INDICATE THE AMOUNT OF TIME IT TAKES UNTIL YOU ARE AS LIMBER AS YOU WILL BE FOR THE DAY: 2
FATIGUE SCORE: 3.5
TOTAL RAPID3 SCORE: 3.55
PSYCHOLOGICAL DISTRESS SCORE: 1.1
PATIENT GLOBAL ASSESSMENT SCORE: 4
AM STIFFNESS SCORE: 1, YES
MDHAQ FUNCTION SCORE: 1.1
PAIN SCORE: 3

## 2025-06-05 ENCOUNTER — RESULTS FOLLOW-UP (OUTPATIENT)
Dept: RHEUMATOLOGY | Facility: CLINIC | Age: 25
End: 2025-06-05

## 2025-06-05 LAB
DLCO ADJ PRE: 25.65 ML/(MIN*MMHG) (ref 29.97–43.82)
DLCO SINGLE BREATH LLN: 29.97
DLCO SINGLE BREATH PRE REF: 67.9 %
DLCO SINGLE BREATH REF: 36.89
DLCOC SBVA LLN: 3.8
DLCOC SBVA PRE REF: 103.3 %
DLCOC SBVA REF: 5.03
DLCOC SINGLE BREATH LLN: 29.97
DLCOC SINGLE BREATH PRE REF: 69.5 %
DLCOC SINGLE BREATH REF: 36.89
DLCOCSBVAULN: 6.27
DLCOCSINGLEBREATHULN: 43.82
DLCOCSINGLEBREATHZSCORE: -2.67
DLCOSINGLEBREATHULN: 43.82
DLCOSINGLEBREATHZSCORE: -2.81
DLCOVA LLN: 3.8
DLCOVA PRE REF: 100.9 %
DLCOVA PRE: 5.08 ML/(MIN*MMHG*L) (ref 3.8–6.27)
DLCOVA REF: 5.03
DLCOVAULN: 6.27
DLVAADJ PRE: 5.2 ML/(MIN*MMHG*L) (ref 3.8–6.27)
FEF 25 75 LLN: 2.59
FEF 25 75 PRE REF: 116 %
FEF 25 75 REF: 4.4
FEV05 LLN: 1.91
FEV05 REF: 3.35
FEV1 FVC LLN: 74
FEV1 FVC PRE REF: 103.3 %
FEV1 FVC REF: 85
FEV1 LLN: 3.22
FEV1 PRE REF: 85 %
FEV1 REF: 4.1
FVC LLN: 3.85
FVC PRE REF: 81.7 %
FVC REF: 4.85
IVC PRE: 4 L (ref 3.85–5.86)
IVC SINGLE BREATH LLN: 3.85
IVC SINGLE BREATH PRE REF: 82.5 %
IVC SINGLE BREATH REF: 4.85
IVCSINGLEBREATHULN: 5.86
PEF LLN: 7.45
PEF PRE REF: 105.4 %
PEF REF: 10.18
PHYSICIAN COMMENT: ABNORMAL
PRE DLCO: 25.05 ML/(MIN*MMHG) (ref 29.97–43.82)
PRE FEF 25 75: 5.11 L/S (ref 2.59–6.7)
PRE FET 100: 6.9 SEC
PRE FEV05 REF: 89.5 %
PRE FEV1 FVC: 87.94 % (ref 73.88–94.46)
PRE FEV1: 3.49 L (ref 3.22–4.95)
PRE FEV5: 2.99 L (ref 1.91–4.78)
PRE FVC: 3.96 L (ref 3.85–5.86)
PRE PEF: 10.72 L/S (ref 7.45–12.91)
VA PRE: 4.93 L (ref 7.18–7.18)
VA SINGLE BREATH LLN: 7.18
VA SINGLE BREATH PRE REF: 68.7 %
VA SINGLE BREATH REF: 7.18
VASINGLEBREATHULN: 7.18

## 2025-06-10 DIAGNOSIS — M34.9 DIFFUSE SYSTEMIC SCLEROSIS: Primary | ICD-10-CM

## 2025-06-11 DIAGNOSIS — M34.9 SYSTEMIC SCLEROSIS: Primary | ICD-10-CM

## 2025-06-18 DIAGNOSIS — M34.9 DIFFUSE SYSTEMIC SCLEROSIS: Primary | ICD-10-CM

## 2025-06-25 ENCOUNTER — HOSPITAL ENCOUNTER (OUTPATIENT)
Dept: CARDIOLOGY | Facility: HOSPITAL | Age: 25
Discharge: HOME | End: 2025-06-25
Attending: INTERNAL MEDICINE
Payer: COMMERCIAL

## 2025-06-25 ENCOUNTER — HOSPITAL ENCOUNTER (OUTPATIENT)
Dept: RADIOLOGY | Facility: HOSPITAL | Age: 25
Discharge: HOME | End: 2025-06-25
Attending: INTERNAL MEDICINE
Payer: COMMERCIAL

## 2025-06-25 ENCOUNTER — HOSPITAL ENCOUNTER (OUTPATIENT)
Dept: CARDIOLOGY | Facility: CLINIC | Age: 25
Discharge: HOME | End: 2025-06-25
Payer: COMMERCIAL

## 2025-06-25 VITALS
BODY MASS INDEX: 39.51 KG/M2 | SYSTOLIC BLOOD PRESSURE: 89 MMHG | HEIGHT: 71 IN | HEART RATE: 87 BPM | DIASTOLIC BLOOD PRESSURE: 57 MMHG | WEIGHT: 282.19 LBS

## 2025-06-25 DIAGNOSIS — M34.9 DIFFUSE SYSTEMIC SCLEROSIS: ICD-10-CM

## 2025-06-25 DIAGNOSIS — J84.9 INTERSTITIAL PULMONARY DISEASE, UNSPECIFIED: ICD-10-CM

## 2025-06-25 DIAGNOSIS — M34.9 SYSTEMIC SCLEROSIS: ICD-10-CM

## 2025-06-25 LAB
AORTIC SIZE INDEX (SOV): 1.1 CM/M2
AORTIC SIZE INDEX: 1.2 CM/M2
ASCENDING AORTA: 2.9 CM
AV AREA BY CONTINUOUS VTI: 4 CM2
AV INDEX (PROSTH): 0.85
AV LVOT MEAN GRADIENT: 2 MMHG
AV LVOT PEAK GRADIENT: 4 MMHG
AV MEAN GRADIENT: 2 MMHG
AV PEAK GRADIENT: 5 MMHG
AV VALVE AREA BY VELOCITY RATIO: 4.5 CM²
AV VALVE AREA: 4.2 CM2
AV VELOCITY RATIO: 0.91
BSA FOR ECHO PROCEDURE: 2.53 M2
CV ECHO LV RWT: 0.38 CM
DOP CALC AO PEAK VEL: 1.1 M/S
DOP CALC AO VTI: 22.7 CM
DOP CALC LVOT AREA: 4.9 CM2
DOP CALC LVOT DIAMETER: 2.5 CM
DOP CALC LVOT PEAK VEL: 1 M/S
DOP CALC LVOT STROKE VOLUME: 94.7 CM3
DOP CALCLVOT PEAK VEL VTI: 19.3 CM
E WAVE DECELERATION TIME: 124 MS
E/A RATIO: 2.11
E/E' RATIO: 9 M/S
ECHO EF ESTIMATED: 63 %
ECHO LV POSTERIOR WALL: 0.9 CM (ref 0.6–1.1)
EJECTION FRACTION: 60 %
FRACTIONAL SHORTENING: 33.3 % (ref 28–44)
INTERVENTRICULAR SEPTUM: 0.8 CM (ref 0.6–1.1)
IVRT: 88 MS
LA MAJOR: 4.9 CM
LA MINOR: 5.1 CM
LA WIDTH: 4.2 CM
LEFT ATRIUM SIZE: 3.9 CM
LEFT ATRIUM VOLUME INDEX MOD: 27 ML/M2
LEFT ATRIUM VOLUME INDEX: 29 ML/M2
LEFT ATRIUM VOLUME MOD: 65 ML
LEFT ATRIUM VOLUME: 70 CM3
LEFT INTERNAL DIMENSION IN SYSTOLE: 3.2 CM (ref 2.1–4)
LEFT VENTRICLE DIASTOLIC VOLUME INDEX: 45.9 ML/M2
LEFT VENTRICLE DIASTOLIC VOLUME: 112 ML
LEFT VENTRICLE MASS INDEX: 56.2 G/M2
LEFT VENTRICLE SYSTOLIC VOLUME INDEX: 16.8 ML/M2
LEFT VENTRICLE SYSTOLIC VOLUME: 41 ML
LEFT VENTRICULAR INTERNAL DIMENSION IN DIASTOLE: 4.8 CM (ref 3.5–6)
LEFT VENTRICULAR MASS: 137.1 G
LV LATERAL E/E' RATIO: 7.3
LV SEPTAL E/E' RATIO: 11.9
MV PEAK A VEL: 0.45 M/S
MV PEAK E VEL: 0.95 M/S
OHS CV RV/LV RATIO: 0.92 CM
OHS QRS DURATION: 110 MS
OHS QTC CALCULATION: 409 MS
PISA TR MAX VEL: 2.8 M/S
PULM VEIN S/D RATIO: 0.72
PV PEAK D VEL: 0.47 M/S
PV PEAK S VEL: 0.34 M/S
RA MAJOR: 5.66 CM
RA WIDTH: 4.7 CM
RIGHT ATRIAL AREA: 25.3 CM2
RIGHT ATRIUM END SYSTOLIC VOLUME APICAL 4 CHAMBER INDEX BSA: 48.77 ML/M2
RIGHT ATRIUM VOLUME AREA LENGTH APICAL 4 CHAMBER: 119 ML
RIGHT VENTRICLE DIASTOLIC BASEL DIMENSION: 4.4 CM
RV TISSUE DOPPLER FREE WALL SYSTOLIC VELOCITY 1 (APICAL 4 CHAMBER VIEW): 14.52 CM/S
SINUS: 2.7 CM
STJ: 2.5 CM
TDI LATERAL: 0.13 M/S
TDI SEPTAL: 0.08 M/S
TDI: 0.11 M/S
TRICUSPID ANNULAR PLANE SYSTOLIC EXCURSION: 2.6 CM
TV PEAK GRADIENT: 31 MMHG
Z-SCORE OF LEFT VENTRICULAR DIMENSION IN END DIASTOLE: -9.15
Z-SCORE OF LEFT VENTRICULAR DIMENSION IN END SYSTOLE: -6.36

## 2025-06-25 PROCEDURE — 71250 CT THORAX DX C-: CPT | Mod: TC

## 2025-06-25 PROCEDURE — 93306 TTE W/DOPPLER COMPLETE: CPT

## 2025-06-25 NOTE — PROGRESS NOTES
CT [facility] scanner 2   Using Marcia Large protocol patient BMI 39.5  DFOV 35.6

## 2025-06-26 DIAGNOSIS — Z79.899 HIGH RISK MEDICATION USE: Primary | ICD-10-CM

## 2025-06-30 ENCOUNTER — OFFICE VISIT (OUTPATIENT)
Dept: FAMILY MEDICINE | Facility: CLINIC | Age: 25
End: 2025-06-30
Payer: COMMERCIAL

## 2025-06-30 VITALS
WEIGHT: 285.19 LBS | SYSTOLIC BLOOD PRESSURE: 114 MMHG | BODY MASS INDEX: 39.93 KG/M2 | HEIGHT: 71 IN | OXYGEN SATURATION: 98 % | HEART RATE: 84 BPM | DIASTOLIC BLOOD PRESSURE: 76 MMHG | TEMPERATURE: 98 F | RESPIRATION RATE: 20 BRPM

## 2025-06-30 DIAGNOSIS — Z11.52 ENCOUNTER FOR SCREENING FOR COVID-19: Primary | ICD-10-CM

## 2025-06-30 LAB
CTP QC/QA: YES
SARS-COV-2 RDRP RESP QL NAA+PROBE: NEGATIVE

## 2025-06-30 PROCEDURE — 1160F RVW MEDS BY RX/DR IN RCRD: CPT | Mod: ,,,

## 2025-06-30 PROCEDURE — 3078F DIAST BP <80 MM HG: CPT | Mod: ,,,

## 2025-06-30 PROCEDURE — 3008F BODY MASS INDEX DOCD: CPT | Mod: ,,,

## 2025-06-30 PROCEDURE — 99212 OFFICE O/P EST SF 10 MIN: CPT | Mod: ,,,

## 2025-06-30 PROCEDURE — 87635 SARS-COV-2 COVID-19 AMP PRB: CPT | Mod: QW,,,

## 2025-06-30 PROCEDURE — 1159F MED LIST DOCD IN RCRD: CPT | Mod: ,,,

## 2025-06-30 PROCEDURE — 3074F SYST BP LT 130 MM HG: CPT | Mod: ,,,

## 2025-06-30 NOTE — PROGRESS NOTES
HPI:   oRny Silva is a pleasant 24 y.o. patient who reports to clinic with complaints of Covid testing for clinical trial he is doing with Dr. Koch in [place]. He has a history of systemic sceroltis or scleroderma. He went to see a rheumatologist in [place] for his dx. He has been taking celcept. He is going to try a new drug and he states that this clinical trial he needs to get covid test to make sure he doesn't have covid before he starts. The drug he is starting is in a PO form but he is gong to try the shot form of it. He will start the medication July 9th. He will know if he got chosen for the trial on July 9th. We will do COVID testing for keyur today. He is having no s/s.                     Past Medical History:   Diagnosis Date    Arthritis     Diffuse systemic sclerosis     Raynaud's syndrome without gangrene 03/22/2023    Vitiligo 03/22/2023       PAST SURGICAL HISTORY:   Past Surgical History:   Procedure Laterality Date    Knee Scope Left 2017       MEDICATIONS:  Current Medications[1]    ALLERGIES:   Review of patient's allergies indicates:   Allergen Reactions    Tylenol [acetaminophen]     Acetomenaphthone Other (See Comments)     Elevated liver enzymes when in childhood    Ruxience [rituximab-pvvr] Nausea And Vomiting and Other (See Comments)     Abd pain          Review of Systems   Constitutional: Negative.  Negative for activity change, chills and fever.   HENT: Negative.  Negative for drooling and nosebleeds.    Eyes: Negative.    Respiratory: Negative.  Negative for chest tightness.    Cardiovascular: Negative.  Negative for chest pain and palpitations.   Gastrointestinal: Negative.    Endocrine: Negative.    Genitourinary: Negative.  Negative for difficulty urinating.   Musculoskeletal: Negative.    Integumentary:  Negative.   Allergic/Immunologic: Negative.    Neurological: Negative.  Negative for dizziness.   Hematological: Negative.    Psychiatric/Behavioral: Negative.     All other  "systems reviewed and are negative.         Physical Exam  Constitutional:       General: He is not in acute distress.     Appearance: Normal appearance. He is well-developed. He is not ill-appearing.   HENT:      Head: Normocephalic and atraumatic.      Right Ear: Tympanic membrane normal.      Left Ear: Tympanic membrane normal.      Nose: Nose normal.      Mouth/Throat:      Mouth: Mucous membranes are moist.      Pharynx: Oropharynx is clear. No posterior oropharyngeal erythema.   Cardiovascular:      Rate and Rhythm: Normal rate and regular rhythm.      Pulses: Normal pulses.      Heart sounds: Normal heart sounds.   Pulmonary:      Effort: Pulmonary effort is normal. No accessory muscle usage or respiratory distress.      Breath sounds: Normal breath sounds.   Abdominal:      General: Abdomen is flat. Bowel sounds are normal. There is no distension.      Palpations: Abdomen is soft.      Tenderness: There is no abdominal tenderness.   Musculoskeletal:         General: Normal range of motion.      Cervical back: Normal range of motion.   Skin:     General: Skin is warm and dry.      Capillary Refill: Capillary refill takes less than 2 seconds.   Neurological:      Mental Status: He is alert and oriented to person, place, and time. Mental status is at baseline.   Psychiatric:         Mood and Affect: Mood normal.         Speech: Speech normal.         Behavior: Behavior normal. Behavior is cooperative.         Thought Content: Thought content normal.          VITAL SIGNS:   /76 (BP Location: Left arm, Patient Position: Sitting)   Pulse 84   Temp 98.1 °F (36.7 °C) (Oral)   Resp 20   Ht 5' 10.87" (1.8 m)   Wt 129.4 kg (285 lb 3.2 oz)   SpO2 98%   BMI 39.92 kg/m²       ASSESSMENT/PLAN  1. Encounter for screening for COVID-19  Assessment & Plan:  COVID test ordered today.   COVID test negative    Orders:  -     Cancel: POCT COVID-19 Rapid Screening  -     Cancel: COVID-19 Routine Screening  -     POCT " COVID-19 Rapid Screening; Future; Expected date: 06/30/2025             There are no Patient Instructions on file for this visit.  Orders Placed This Encounter   Procedures    POCT COVID-19 Rapid Screening     Standing Status:   Future     Expected Date:   6/30/2025     Expiration Date:   8/29/2026                [1]   Current Outpatient Medications:     amLODIPine (NORVASC) 5 MG tablet, Take 5 mg by mouth once daily., Disp: , Rfl:     mycophenolate (CELLCEPT) 500 mg Tab, Take 3 tablets (1,500 mg total) by mouth 2 (two) times daily., Disp: 180 tablet, Rfl: 2    omeprazole (PRILOSEC) 40 MG capsule, Take 1 capsule (40 mg total) by mouth 2 (two) times daily before meals., Disp: 180 capsule, Rfl: 4

## 2025-07-03 ENCOUNTER — RESULTS FOLLOW-UP (OUTPATIENT)
Dept: RHEUMATOLOGY | Facility: CLINIC | Age: 25
End: 2025-07-03

## 2025-07-07 DIAGNOSIS — M34.9 DIFFUSE SYSTEMIC SCLEROSIS: Primary | ICD-10-CM

## 2025-07-08 PROBLEM — K21.9 GASTROESOPHAGEAL REFLUX DISEASE: Chronic | Status: ACTIVE | Noted: 2024-04-24

## 2025-07-09 DIAGNOSIS — M34.9 SYSTEMIC SCLEROSIS: ICD-10-CM

## 2025-07-09 DIAGNOSIS — M34.9 DIFFUSE SYSTEMIC SCLEROSIS: Primary | ICD-10-CM

## 2025-07-10 ENCOUNTER — OFFICE VISIT (OUTPATIENT)
Dept: RHEUMATOLOGY | Facility: CLINIC | Age: 25
End: 2025-07-10
Payer: COMMERCIAL

## 2025-07-10 ENCOUNTER — HOSPITAL ENCOUNTER (OUTPATIENT)
Dept: PULMONOLOGY | Facility: CLINIC | Age: 25
Discharge: HOME | End: 2025-07-10
Payer: COMMERCIAL

## 2025-07-10 ENCOUNTER — HOSPITAL ENCOUNTER (OUTPATIENT)
Dept: CARDIOLOGY | Facility: CLINIC | Age: 25
Discharge: HOME | End: 2025-07-10
Payer: COMMERCIAL

## 2025-07-10 ENCOUNTER — LAB VISIT (OUTPATIENT)
Dept: LAB | Facility: HOSPITAL | Age: 25
End: 2025-07-10
Attending: INTERNAL MEDICINE
Payer: COMMERCIAL

## 2025-07-10 ENCOUNTER — CLINICAL SUPPORT (OUTPATIENT)
Dept: RHEUMATOLOGY | Facility: CLINIC | Age: 25
End: 2025-07-10
Payer: COMMERCIAL

## 2025-07-10 VITALS
HEART RATE: 74 BPM | SYSTOLIC BLOOD PRESSURE: 97 MMHG | BODY MASS INDEX: 41.03 KG/M2 | DIASTOLIC BLOOD PRESSURE: 66 MMHG | WEIGHT: 286.63 LBS | TEMPERATURE: 98 F | HEIGHT: 70 IN | OXYGEN SATURATION: 98 %

## 2025-07-10 VITALS
BODY MASS INDEX: 41.03 KG/M2 | HEART RATE: 80 BPM | DIASTOLIC BLOOD PRESSURE: 69 MMHG | SYSTOLIC BLOOD PRESSURE: 102 MMHG | HEIGHT: 70 IN | WEIGHT: 286.63 LBS

## 2025-07-10 DIAGNOSIS — I73.01 RAYNAUD'S PHENOMENON WITH GANGRENE: ICD-10-CM

## 2025-07-10 DIAGNOSIS — M34.9 DIFFUSE SYSTEMIC SCLEROSIS: ICD-10-CM

## 2025-07-10 DIAGNOSIS — K22.4 ESOPHAGEAL DYSMOTILITY DUE TO SYSTEMIC DISEASE: ICD-10-CM

## 2025-07-10 DIAGNOSIS — M34.9 DIFFUSE SYSTEMIC SCLEROSIS: Primary | ICD-10-CM

## 2025-07-10 DIAGNOSIS — Z79.899 HIGH RISK MEDICATION USE: ICD-10-CM

## 2025-07-10 DIAGNOSIS — M34.9 SYSTEMIC SCLEROSIS: ICD-10-CM

## 2025-07-10 LAB
DLCO SINGLE BREATH LLN: 29.97
DLCO SINGLE BREATH PRE REF: 58.3 %
DLCO SINGLE BREATH REF: 36.89
DLCOC SBVA LLN: 3.8
DLCOC SBVA REF: 5.03
DLCOC SINGLE BREATH LLN: 29.97
DLCOC SINGLE BREATH REF: 36.89
DLCOCSBVAULN: 6.27
DLCOCSINGLEBREATHULN: 43.82
DLCOSINGLEBREATHULN: 43.82
DLCOSINGLEBREATHZSCORE: -3.65
DLCOVA LLN: 3.8
DLCOVA PRE REF: 91.7 %
DLCOVA PRE: 4.61 ML/(MIN*MMHG*L) (ref 3.8–6.27)
DLCOVA REF: 5.03
DLCOVAULN: 6.27
ERYTHROCYTE [SEDIMENTATION RATE] IN BLOOD BY PHOTOMETRIC METHOD: 8 MM/HR
FEF 25 75 LLN: 2.58
FEF 25 75 PRE REF: 108.8 %
FEF 25 75 REF: 4.4
FEV05 LLN: 1.91
FEV05 REF: 3.35
FEV1 FVC LLN: 74
FEV1 FVC PRE REF: 104.4 %
FEV1 FVC REF: 85
FEV1 LLN: 3.22
FEV1 PRE REF: 80.9 %
FEV1 REF: 4.1
FVC LLN: 3.85
FVC PRE REF: 77 %
FVC REF: 4.85
IVC PRE: 3.73 L (ref 3.85–5.86)
IVC SINGLE BREATH LLN: 3.85
IVC SINGLE BREATH PRE REF: 76.9 %
IVC SINGLE BREATH REF: 4.85
IVCSINGLEBREATHULN: 5.86
OHS QRS DURATION: 100 MS
OHS QTC CALCULATION: 420 MS
PEF LLN: 7.45
PEF PRE REF: 90 %
PEF REF: 10.18
PHYSICIAN COMMENT: ABNORMAL
PRE DLCO: 21.52 ML/(MIN*MMHG) (ref 29.97–43.82)
PRE FEF 25 75: 4.79 L/S (ref 2.58–6.7)
PRE FET 100: 5.52 SEC
PRE FEV05 REF: 82.9 %
PRE FEV1 FVC: 88.83 % (ref 73.86–94.42)
PRE FEV1: 3.32 L (ref 3.22–4.95)
PRE FEV5: 2.77 L (ref 1.91–4.78)
PRE FVC: 3.73 L (ref 3.85–5.86)
PRE PEF: 9.16 L/S (ref 7.45–12.91)
VA PRE: 4.66 L (ref 7.18–7.18)
VA SINGLE BREATH LLN: 7.18
VA SINGLE BREATH PRE REF: 65 %
VA SINGLE BREATH REF: 7.18
VASINGLEBREATHULN: 7.18

## 2025-07-10 PROCEDURE — 85652 RBC SED RATE AUTOMATED: CPT

## 2025-07-10 PROCEDURE — 36415 COLL VENOUS BLD VENIPUNCTURE: CPT

## 2025-07-10 ASSESSMENT — ROUTINE ASSESSMENT OF PATIENT INDEX DATA (RAPID3)
TOTAL RAPID3 SCORE: 4.05
PSYCHOLOGICAL DISTRESS SCORE: 1.1
PAIN SCORE: 3.5
PATIENT GLOBAL ASSESSMENT SCORE: 4
AM STIFFNESS SCORE: 1, YES
MDHAQ FUNCTION SCORE: 1.4
WHEN YOU AWAKENED IN THE MORNING OVER THE LAST WEEK, PLEASE INDICATE THE AMOUNT OF TIME IT TAKES UNTIL YOU ARE AS LIMBER AS YOU WILL BE FOR THE DAY: 2
FATIGUE SCORE: 1.5
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
## 2025-07-10 NOTE — ASSESSMENT & PLAN NOTE
He reports no interval infections     He will need to catch up on some vaccines and will attempt to schedule this with study so as not to interfere with assessments

## 2025-07-10 NOTE — PROGRESS NOTES
7/10/2025     1:59 AM   Rapid3 Question Responses and Scores   MDHAQ Score 1.4   Psychologic Score 1.1   Pain Score 3.5   When you awakened in the morning OVER THE LAST WEEK, did you feel stiff? Yes   If Yes, please indicate the number of hours until you are as limber as you will be for the day 2   Fatigue Score 1.5   Global Health Score 4   RAPID3 Score 4.06        Answers submitted by the patient for this visit:  Rheumatology Questionnaire (Submitted on 7/10/2025)  fever: No  eye redness: No  mouth sores: No  headaches: No  shortness of breath: No  chest pain: No  trouble swallowing: No  diarrhea: No  constipation: No  unexpected weight change: No  genital sore: No  During the last 3 days, have you had a skin rash?: No  Bruises or bleeds easily: No  cough: No
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
## 2025-07-10 NOTE — ASSESSMENT & PLAN NOTE
Systemic sclerosis complicated by skin and esophageal disease, some increase in mRSS since previous visit though medications have been stable    Meets entry criteria for KERRY study; will complete lab and pulmonary function test and randomize to study drug

## 2025-07-16 ENCOUNTER — OFFICE VISIT (OUTPATIENT)
Dept: RHEUMATOLOGY | Facility: CLINIC | Age: 25
End: 2025-07-16
Payer: COMMERCIAL

## 2025-07-16 VITALS
HEART RATE: 89 BPM | SYSTOLIC BLOOD PRESSURE: 129 MMHG | DIASTOLIC BLOOD PRESSURE: 70 MMHG | WEIGHT: 284.38 LBS | BODY MASS INDEX: 40.71 KG/M2 | HEIGHT: 70 IN

## 2025-07-16 DIAGNOSIS — M34.9 DIFFUSE SYSTEMIC SCLEROSIS: Primary | ICD-10-CM

## 2025-07-16 NOTE — PROGRESS NOTES
Answers submitted by the patient for this visit:  Rheumatology Questionnaire (Submitted on 7/11/2025)  fever: No  eye redness: No  mouth sores: No  headaches: No  shortness of breath: No  chest pain: No  trouble swallowing: No  diarrhea: No  constipation: No  unexpected weight change: No  genital sore: No  During the last 3 days, have you had a skin rash?: No  Bruises or bleeds easily: No  cough: No  Answers submitted by the patient for this visit:  Rheumatology Questionnaire (Submitted on 7/11/2025)  fever: No  eye redness: No  mouth sores: No  headaches: No  shortness of breath: No  chest pain: No  trouble swallowing: No  diarrhea: No  constipation: No  unexpected weight change: No  genital sore: No  During the last 3 days, have you had a skin rash?: No  Bruises or bleeds easily: No  cough: No
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
## 2025-07-22 DIAGNOSIS — Z00.6 CLINICAL TRIAL PARTICIPANT: ICD-10-CM

## 2025-07-22 DIAGNOSIS — Z00.6 EXAM FOR CLINICAL TRIAL: Primary | ICD-10-CM

## 2025-07-24 ENCOUNTER — PATIENT MESSAGE (OUTPATIENT)
Dept: RHEUMATOLOGY | Facility: CLINIC | Age: 25
End: 2025-07-24
Payer: COMMERCIAL

## 2025-07-25 DIAGNOSIS — M34.9 DIFFUSE SYSTEMIC SCLEROSIS: ICD-10-CM

## 2025-07-25 RX ORDER — MYCOPHENOLATE MOFETIL 500 MG/1
1500 TABLET ORAL 2 TIMES DAILY
Qty: 180 TABLET | Refills: 2 | Status: SHIPPED | OUTPATIENT
Start: 2025-07-25

## 2025-07-25 RX ORDER — AMLODIPINE BESYLATE 5 MG/1
5 TABLET ORAL DAILY
Qty: 90 TABLET | Refills: 3 | Status: SHIPPED | OUTPATIENT
Start: 2025-07-25

## 2025-07-25 NOTE — TELEPHONE ENCOUNTER
Medication refill requested for cellcept. Last office visit on 7/16/25. ESR done on 7/10/25. Other labs done on 4/17/25.

## 2025-08-04 DIAGNOSIS — M34.9 DIFFUSE SYSTEMIC SCLEROSIS: Primary | ICD-10-CM

## 2025-08-06 ENCOUNTER — DOCUMENTATION ONLY (OUTPATIENT)
Dept: RESEARCH | Facility: HOSPITAL | Age: 25
End: 2025-08-06
Payer: COMMERCIAL

## 2025-08-06 ENCOUNTER — HOSPITAL ENCOUNTER (OUTPATIENT)
Dept: PULMONOLOGY | Facility: CLINIC | Age: 25
Discharge: HOME | End: 2025-08-06
Payer: COMMERCIAL

## 2025-08-06 ENCOUNTER — LAB VISIT (OUTPATIENT)
Dept: LAB | Facility: HOSPITAL | Age: 25
End: 2025-08-06
Payer: COMMERCIAL

## 2025-08-06 ENCOUNTER — OFFICE VISIT (OUTPATIENT)
Dept: RHEUMATOLOGY | Facility: CLINIC | Age: 25
End: 2025-08-06
Payer: COMMERCIAL

## 2025-08-06 VITALS
TEMPERATURE: 98 F | SYSTOLIC BLOOD PRESSURE: 123 MMHG | BODY MASS INDEX: 40.96 KG/M2 | DIASTOLIC BLOOD PRESSURE: 81 MMHG | HEART RATE: 92 BPM | WEIGHT: 285.5 LBS

## 2025-08-06 DIAGNOSIS — Z00.6 EXAM FOR CLINICAL TRIAL: ICD-10-CM

## 2025-08-06 DIAGNOSIS — Z00.6 CLINICAL TRIAL PARTICIPANT: ICD-10-CM

## 2025-08-06 DIAGNOSIS — M34.9 DIFFUSE SYSTEMIC SCLEROSIS: Primary | ICD-10-CM

## 2025-08-06 DIAGNOSIS — M34.9 DIFFUSE SYSTEMIC SCLEROSIS: ICD-10-CM

## 2025-08-06 LAB
DLCO SINGLE BREATH LLN: 29.97
DLCO SINGLE BREATH PRE REF: 63 %
DLCO SINGLE BREATH REF: 36.89
DLCOC SBVA LLN: 3.8
DLCOC SBVA REF: 5.03
DLCOC SINGLE BREATH LLN: 29.97
DLCOC SINGLE BREATH REF: 36.89
DLCOCSBVAULN: 6.27
DLCOCSINGLEBREATHULN: 43.82
DLCOSINGLEBREATHULN: 43.82
DLCOSINGLEBREATHZSCORE: -3.24
DLCOVA LLN: 3.8
DLCOVA PRE REF: 97.7 %
DLCOVA PRE: 4.92 ML/(MIN*MMHG*L) (ref 3.8–6.27)
DLCOVA REF: 5.03
DLCOVAULN: 6.27
ERYTHROCYTE [SEDIMENTATION RATE] IN BLOOD BY PHOTOMETRIC METHOD: 26 MM/HR
FEF 25 75 LLN: 2.58
FEF 25 75 PRE REF: 104.6 %
FEF 25 75 REF: 4.4
FEV05 LLN: 1.91
FEV05 REF: 3.35
FEV1 FVC LLN: 74
FEV1 FVC PRE REF: 103.8 %
FEV1 FVC REF: 85
FEV1 LLN: 3.22
FEV1 PRE REF: 82.4 %
FEV1 REF: 4.1
FVC LLN: 3.85
FVC PRE REF: 78.8 %
FVC REF: 4.85
IVC PRE: 3.84 L (ref 3.85–5.86)
IVC SINGLE BREATH LLN: 3.85
IVC SINGLE BREATH PRE REF: 79.2 %
IVC SINGLE BREATH REF: 4.85
IVCSINGLEBREATHULN: 5.86
PEF LLN: 7.45
PEF PRE REF: 100.6 %
PEF REF: 10.18
PHYSICIAN COMMENT: ABNORMAL
PRE DLCO: 23.24 ML/(MIN*MMHG) (ref 29.97–43.82)
PRE FEF 25 75: 4.6 L/S (ref 2.58–6.69)
PRE FET 100: 6.43 SEC
PRE FEV05 REF: 84.6 %
PRE FEV1 FVC: 88.32 % (ref 73.84–94.39)
PRE FEV1: 3.38 L (ref 3.22–4.95)
PRE FEV5: 2.83 L (ref 1.91–4.78)
PRE FVC: 3.82 L (ref 3.85–5.86)
PRE PEF: 10.24 L/S (ref 7.45–12.91)
VA PRE: 4.73 L (ref 7.18–7.18)
VA SINGLE BREATH LLN: 7.18
VA SINGLE BREATH PRE REF: 65.8 %
VA SINGLE BREATH REF: 7.18
VASINGLEBREATHULN: 7.18

## 2025-08-06 PROCEDURE — 36415 COLL VENOUS BLD VENIPUNCTURE: CPT

## 2025-08-06 PROCEDURE — 85652 RBC SED RATE AUTOMATED: CPT

## 2025-08-06 NOTE — ASSESSMENT & PLAN NOTE
Tolerating study drug (anifrolumab vs placebo) with out clinical adverse effects    Has developed tendinitis R ankle consistent with underlying scleroderma; improving with rest and Aleve; will report as adverse effects but not likely related to Rx     Plan is to continue current background and study drugs and continue per study protocol
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
## 2025-08-07 NOTE — PROGRESS NOTES
Patient Rony Silva came in on August 6, 2025 for the Clinical Trial for Visit 4 of [place] ([id number]). The patient consented on 03/JUNE/2025 to participate and there was a 6 week screening period for him to complete all required assessments. These assessments included vital signs that included the weight, physical exam, an EKG, Echocardiogram, blood tests, COVID-19 test, TB surveillance assessment, digital ulcer count, mRSS assessment, the lung function spirometry test and the DLCo test that is required at screening The patient came in on 25/JUNE/2025 to complete all those required assessments that included a required EKG and Non-contrast CT Scan. The patient was approved and randomized on 10/JULY/2025 which was Visit 2 of the clinical trial where the first medication dose of an injection was administered and given per rheumatology pharmacist Shaun Mckeon. The patient returned on 16/JULY/2025 to perform a self administered injection in the clinic in the abdomen. The patient will be given injections at home moving forward and 4 kits were given to the patient on 16/JULY/2025. Today all assessments that were done included the following:    Vital Signs  Weight  TB surveillance assessment  COVID-19 assessment  Subject questionnaires  Electronic Diary  Assessment of adverse events  Blood Tests  Hs-CRP  ESR  mRSS assessment  Spirometry (FVC)  Digital ulcer count  Physical global assessment, Clinician Global Impression of Change, Clinical Global Impression of Severity

## 2025-08-25 ENCOUNTER — HOSPITAL ENCOUNTER (EMERGENCY)
Facility: HOSPITAL | Age: 25
Discharge: HOME | End: 2025-08-26
Payer: COMMERCIAL

## 2025-08-26 ENCOUNTER — TELEPHONE (OUTPATIENT)
Dept: EMERGENCY MEDICINE | Facility: HOSPITAL | Age: 25
End: 2025-08-26
Payer: COMMERCIAL

## 2025-08-27 ENCOUNTER — TELEPHONE (OUTPATIENT)
Dept: EMERGENCY MEDICINE | Facility: HOSPITAL | Age: 25
End: 2025-08-27
Payer: COMMERCIAL

## 2025-09-03 ENCOUNTER — OFFICE VISIT (OUTPATIENT)
Dept: RHEUMATOLOGY | Facility: CLINIC | Age: 25
End: 2025-09-03
Payer: COMMERCIAL

## 2025-09-03 ENCOUNTER — DOCUMENTATION ONLY (OUTPATIENT)
Dept: RESEARCH | Facility: HOSPITAL | Age: 25
End: 2025-09-03
Payer: COMMERCIAL

## 2025-09-03 DIAGNOSIS — I73.01 RAYNAUD'S PHENOMENON WITH GANGRENE: ICD-10-CM

## 2025-09-03 DIAGNOSIS — Z79.899 HIGH RISK MEDICATION USE: ICD-10-CM

## 2025-09-03 DIAGNOSIS — M34.9 DIFFUSE SYSTEMIC SCLEROSIS: Primary | ICD-10-CM
